# Patient Record
Sex: FEMALE | Race: WHITE
[De-identification: names, ages, dates, MRNs, and addresses within clinical notes are randomized per-mention and may not be internally consistent; named-entity substitution may affect disease eponyms.]

---

## 2022-05-09 ENCOUNTER — HOSPITAL ENCOUNTER (INPATIENT)
Dept: HOSPITAL 54 - ER | Age: 87
LOS: 21 days | Discharge: NURSING, REHAB OR LONG-TERM CARE | DRG: 871 | End: 2022-05-30
Attending: INTERNAL MEDICINE | Admitting: NURSE PRACTITIONER
Payer: MEDICARE

## 2022-05-09 VITALS — WEIGHT: 117 LBS | BODY MASS INDEX: 21.53 KG/M2 | HEIGHT: 62 IN

## 2022-05-09 DIAGNOSIS — I48.91: ICD-10-CM

## 2022-05-09 DIAGNOSIS — E87.4: ICD-10-CM

## 2022-05-09 DIAGNOSIS — E86.0: ICD-10-CM

## 2022-05-09 DIAGNOSIS — I47.1: ICD-10-CM
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
## 2022-05-09 DIAGNOSIS — G92.8: ICD-10-CM

## 2022-05-09 DIAGNOSIS — R13.10: ICD-10-CM

## 2022-05-09 DIAGNOSIS — J96.01: ICD-10-CM

## 2022-05-09 DIAGNOSIS — F03.90: ICD-10-CM

## 2022-05-09 DIAGNOSIS — N39.0: ICD-10-CM

## 2022-05-09 DIAGNOSIS — I50.33: ICD-10-CM

## 2022-05-09 DIAGNOSIS — F09: ICD-10-CM

## 2022-05-09 DIAGNOSIS — E11.9: ICD-10-CM

## 2022-05-09 DIAGNOSIS — B96.20: ICD-10-CM

## 2022-05-09 DIAGNOSIS — E78.5: ICD-10-CM

## 2022-05-09 DIAGNOSIS — Z79.01: ICD-10-CM

## 2022-05-09 DIAGNOSIS — K29.70: ICD-10-CM

## 2022-05-09 DIAGNOSIS — R65.21: ICD-10-CM

## 2022-05-09 DIAGNOSIS — E88.09: ICD-10-CM

## 2022-05-09 DIAGNOSIS — Z79.82: ICD-10-CM

## 2022-05-09 DIAGNOSIS — R79.89: ICD-10-CM

## 2022-05-09 DIAGNOSIS — J69.0: ICD-10-CM

## 2022-05-09 DIAGNOSIS — A41.9: Primary | ICD-10-CM

## 2022-05-09 DIAGNOSIS — J85.1: ICD-10-CM

## 2022-05-09 DIAGNOSIS — I82.612: ICD-10-CM

## 2022-05-09 DIAGNOSIS — Z20.822: ICD-10-CM

## 2022-05-09 DIAGNOSIS — I11.0: ICD-10-CM

## 2022-05-09 LAB
ALBUMIN SERPL BCP-MCNC: 3 G/DL (ref 3.4–5)
ALP SERPL-CCNC: 88 U/L (ref 46–116)
ALT SERPL W P-5'-P-CCNC: 21 U/L (ref 12–78)
AST SERPL W P-5'-P-CCNC: 30 U/L (ref 15–37)
BASE EXCESS BLDA CALC-SCNC: -3 MMOL/L
BASOPHILS # BLD AUTO: 0 K/UL (ref 0–0.2)
BASOPHILS NFR BLD AUTO: 0.1 % (ref 0–2)
BILIRUB DIRECT SERPL-MCNC: 0.5 MG/DL (ref 0–0.2)
BILIRUB SERPL-MCNC: 1.7 MG/DL (ref 0.2–1)
BILIRUB UR QL STRIP: NEGATIVE
BUN SERPL-MCNC: 25 MG/DL (ref 7–18)
CALCIUM SERPL-MCNC: 8.6 MG/DL (ref 8.5–10.1)
CHLORIDE SERPL-SCNC: 105 MMOL/L (ref 98–107)
CO2 SERPL-SCNC: 27 MMOL/L (ref 21–32)
COLOR UR: YELLOW
CREAT SERPL-MCNC: 0.9 MG/DL (ref 0.6–1.3)
DEPRECATED SQUAMOUS URNS QL MICRO: (no result) /HPF
EOSINOPHIL NFR BLD AUTO: 0.1 % (ref 0–6)
GLUCOSE SERPL-MCNC: 200 MG/DL (ref 74–106)
GLUCOSE UR STRIP-MCNC: NEGATIVE MG/DL
HCT VFR BLD AUTO: 41 % (ref 33–45)
HGB BLD-MCNC: 13.3 G/DL (ref 11.5–14.8)
INHALED O2 CONCENTRATION: 80 %
LEUKOCYTE ESTERASE UR QL STRIP: NEGATIVE
LYMPHOCYTES NFR BLD AUTO: 0.3 K/UL (ref 0.8–4.8)
LYMPHOCYTES NFR BLD AUTO: 3.1 % (ref 20–44)
MCHC RBC AUTO-ENTMCNC: 33 G/DL (ref 31–36)
MCV RBC AUTO: 81 FL (ref 82–100)
MONOCYTES NFR BLD AUTO: 0.3 K/UL (ref 0.1–1.3)
MONOCYTES NFR BLD AUTO: 3.7 % (ref 2–12)
NEUTROPHILS # BLD AUTO: 7.8 K/UL (ref 1.8–8.9)
NEUTROPHILS NFR BLD AUTO: 93 % (ref 43–81)
NITRITE UR QL STRIP: POSITIVE
PCO2 TEMP ADJ BLDA: 31.6 MMHG (ref 35–45)
PH TEMP ADJ BLDA: 7.43 [PH] (ref 7.35–7.45)
PH UR STRIP: 6 [PH] (ref 5–8)
PLATELET # BLD AUTO: 213 K/UL (ref 150–450)
PO2 TEMP ADJ BLDA: 163.3 MMHG (ref 75–100)
POTASSIUM SERPL-SCNC: 3.9 MMOL/L (ref 3.5–5.1)
PROT SERPL-MCNC: 6.4 G/DL (ref 6.4–8.2)
PROT UR QL STRIP: (no result) MG/DL
RBC # BLD AUTO: 5 MIL/UL (ref 4–5.2)
RBC #/AREA URNS HPF: (no result) /HPF (ref 0–2)
SODIUM SERPL-SCNC: 138 MMOL/L (ref 136–145)
UROBILINOGEN UR STRIP-MCNC: 2 EU/DL
VENTILATION MODE VENT: (no result)
WBC #/AREA URNS HPF: (no result) /HPF
WBC #/AREA URNS HPF: (no result) /HPF (ref 0–3)
WBC NRBC COR # BLD AUTO: 8.4 K/UL (ref 4.3–11)

## 2022-05-09 PROCEDURE — 5A09357 ASSISTANCE WITH RESPIRATORY VENTILATION, LESS THAN 24 CONSECUTIVE HOURS, CONTINUOUS POSITIVE AIRWAY PRESSURE: ICD-10-PCS | Performed by: INTERNAL MEDICINE

## 2022-05-09 PROCEDURE — A6253 ABSORPT DRG > 48 SQ IN W/O B: HCPCS

## 2022-05-09 PROCEDURE — C9113 INJ PANTOPRAZOLE SODIUM, VIA: HCPCS

## 2022-05-09 PROCEDURE — A6403 STERILE GAUZE>16 <= 48 SQ IN: HCPCS

## 2022-05-09 PROCEDURE — G0378 HOSPITAL OBSERVATION PER HR: HCPCS

## 2022-05-09 PROCEDURE — C9803 HOPD COVID-19 SPEC COLLECT: HCPCS

## 2022-05-09 NOTE — NUR
TO ER BED 5. BIBRA 39 FROM [facility] FOR ALOC. PT IS AAOX0 , WARM TO TOUCH 
, IN RESPIRATORY DISTRESS. MD AT BEDSIDE. RT AT BEDSIDE. CONNECTED TO MONITOR.

## 2022-05-09 NOTE — NUR
RAC#20G S/L; PATETN AND INTACT. BLOOD COLLECTED AND SENT TO LAB.

PTA L HAND #18G S/L NS 500ML IVF INFUSING

## 2022-05-09 NOTE — NUR
ADMINISTERED AMIO 150MG VIA MDS ORDERS DUE TO URGENT V/S

/86. , SPO2 97 ON BIPAP FIO2 80%.

WILL REASSESS V/S CLOSELY.

## 2022-05-10 VITALS — SYSTOLIC BLOOD PRESSURE: 132 MMHG | DIASTOLIC BLOOD PRESSURE: 55 MMHG

## 2022-05-10 VITALS — DIASTOLIC BLOOD PRESSURE: 50 MMHG | SYSTOLIC BLOOD PRESSURE: 117 MMHG

## 2022-05-10 VITALS — DIASTOLIC BLOOD PRESSURE: 68 MMHG | SYSTOLIC BLOOD PRESSURE: 149 MMHG

## 2022-05-10 VITALS — SYSTOLIC BLOOD PRESSURE: 124 MMHG | DIASTOLIC BLOOD PRESSURE: 57 MMHG

## 2022-05-10 VITALS — DIASTOLIC BLOOD PRESSURE: 85 MMHG | SYSTOLIC BLOOD PRESSURE: 145 MMHG

## 2022-05-10 VITALS — DIASTOLIC BLOOD PRESSURE: 69 MMHG | SYSTOLIC BLOOD PRESSURE: 137 MMHG

## 2022-05-10 VITALS — DIASTOLIC BLOOD PRESSURE: 56 MMHG | SYSTOLIC BLOOD PRESSURE: 95 MMHG

## 2022-05-10 VITALS — DIASTOLIC BLOOD PRESSURE: 67 MMHG | SYSTOLIC BLOOD PRESSURE: 117 MMHG

## 2022-05-10 VITALS — DIASTOLIC BLOOD PRESSURE: 83 MMHG | SYSTOLIC BLOOD PRESSURE: 133 MMHG

## 2022-05-10 VITALS — SYSTOLIC BLOOD PRESSURE: 109 MMHG | DIASTOLIC BLOOD PRESSURE: 48 MMHG

## 2022-05-10 VITALS — SYSTOLIC BLOOD PRESSURE: 138 MMHG | DIASTOLIC BLOOD PRESSURE: 76 MMHG

## 2022-05-10 VITALS — SYSTOLIC BLOOD PRESSURE: 147 MMHG | DIASTOLIC BLOOD PRESSURE: 67 MMHG

## 2022-05-10 VITALS — DIASTOLIC BLOOD PRESSURE: 69 MMHG | SYSTOLIC BLOOD PRESSURE: 142 MMHG

## 2022-05-10 VITALS — SYSTOLIC BLOOD PRESSURE: 108 MMHG | DIASTOLIC BLOOD PRESSURE: 54 MMHG

## 2022-05-10 VITALS — DIASTOLIC BLOOD PRESSURE: 65 MMHG | SYSTOLIC BLOOD PRESSURE: 130 MMHG

## 2022-05-10 VITALS — SYSTOLIC BLOOD PRESSURE: 115 MMHG | DIASTOLIC BLOOD PRESSURE: 66 MMHG

## 2022-05-10 VITALS — SYSTOLIC BLOOD PRESSURE: 136 MMHG | DIASTOLIC BLOOD PRESSURE: 72 MMHG

## 2022-05-10 VITALS — DIASTOLIC BLOOD PRESSURE: 50 MMHG | SYSTOLIC BLOOD PRESSURE: 104 MMHG

## 2022-05-10 VITALS — SYSTOLIC BLOOD PRESSURE: 139 MMHG | DIASTOLIC BLOOD PRESSURE: 68 MMHG

## 2022-05-10 VITALS — SYSTOLIC BLOOD PRESSURE: 126 MMHG | DIASTOLIC BLOOD PRESSURE: 73 MMHG

## 2022-05-10 VITALS — DIASTOLIC BLOOD PRESSURE: 62 MMHG | SYSTOLIC BLOOD PRESSURE: 115 MMHG

## 2022-05-10 VITALS — DIASTOLIC BLOOD PRESSURE: 76 MMHG | SYSTOLIC BLOOD PRESSURE: 143 MMHG

## 2022-05-10 VITALS — SYSTOLIC BLOOD PRESSURE: 120 MMHG | DIASTOLIC BLOOD PRESSURE: 68 MMHG

## 2022-05-10 VITALS — DIASTOLIC BLOOD PRESSURE: 58 MMHG | SYSTOLIC BLOOD PRESSURE: 116 MMHG

## 2022-05-10 VITALS — SYSTOLIC BLOOD PRESSURE: 145 MMHG | DIASTOLIC BLOOD PRESSURE: 80 MMHG

## 2022-05-10 VITALS — DIASTOLIC BLOOD PRESSURE: 57 MMHG | SYSTOLIC BLOOD PRESSURE: 95 MMHG

## 2022-05-10 VITALS — SYSTOLIC BLOOD PRESSURE: 143 MMHG | DIASTOLIC BLOOD PRESSURE: 73 MMHG

## 2022-05-10 VITALS — DIASTOLIC BLOOD PRESSURE: 76 MMHG | SYSTOLIC BLOOD PRESSURE: 128 MMHG

## 2022-05-10 VITALS — DIASTOLIC BLOOD PRESSURE: 92 MMHG | SYSTOLIC BLOOD PRESSURE: 174 MMHG

## 2022-05-10 VITALS — DIASTOLIC BLOOD PRESSURE: 73 MMHG | SYSTOLIC BLOOD PRESSURE: 139 MMHG

## 2022-05-10 VITALS — DIASTOLIC BLOOD PRESSURE: 76 MMHG | SYSTOLIC BLOOD PRESSURE: 142 MMHG

## 2022-05-10 VITALS — SYSTOLIC BLOOD PRESSURE: 100 MMHG | DIASTOLIC BLOOD PRESSURE: 53 MMHG

## 2022-05-10 VITALS — DIASTOLIC BLOOD PRESSURE: 71 MMHG | SYSTOLIC BLOOD PRESSURE: 145 MMHG

## 2022-05-10 VITALS — SYSTOLIC BLOOD PRESSURE: 143 MMHG | DIASTOLIC BLOOD PRESSURE: 77 MMHG

## 2022-05-10 VITALS — SYSTOLIC BLOOD PRESSURE: 151 MMHG | DIASTOLIC BLOOD PRESSURE: 73 MMHG

## 2022-05-10 VITALS — DIASTOLIC BLOOD PRESSURE: 54 MMHG | SYSTOLIC BLOOD PRESSURE: 117 MMHG

## 2022-05-10 VITALS — DIASTOLIC BLOOD PRESSURE: 73 MMHG | SYSTOLIC BLOOD PRESSURE: 155 MMHG

## 2022-05-10 VITALS — DIASTOLIC BLOOD PRESSURE: 65 MMHG | SYSTOLIC BLOOD PRESSURE: 116 MMHG

## 2022-05-10 VITALS — DIASTOLIC BLOOD PRESSURE: 73 MMHG | SYSTOLIC BLOOD PRESSURE: 134 MMHG

## 2022-05-10 VITALS — SYSTOLIC BLOOD PRESSURE: 143 MMHG | DIASTOLIC BLOOD PRESSURE: 68 MMHG

## 2022-05-10 VITALS — SYSTOLIC BLOOD PRESSURE: 129 MMHG | DIASTOLIC BLOOD PRESSURE: 66 MMHG

## 2022-05-10 VITALS — DIASTOLIC BLOOD PRESSURE: 80 MMHG | SYSTOLIC BLOOD PRESSURE: 143 MMHG

## 2022-05-10 VITALS — SYSTOLIC BLOOD PRESSURE: 100 MMHG | DIASTOLIC BLOOD PRESSURE: 60 MMHG

## 2022-05-10 VITALS — SYSTOLIC BLOOD PRESSURE: 118 MMHG | DIASTOLIC BLOOD PRESSURE: 66 MMHG

## 2022-05-10 VITALS — DIASTOLIC BLOOD PRESSURE: 74 MMHG | SYSTOLIC BLOOD PRESSURE: 133 MMHG

## 2022-05-10 LAB
BASOPHILS # BLD AUTO: 0 K/UL (ref 0–0.2)
BASOPHILS NFR BLD AUTO: 0.1 % (ref 0–2)
BUN SERPL-MCNC: 27 MG/DL (ref 7–18)
CALCIUM SERPL-MCNC: 8.3 MG/DL (ref 8.5–10.1)
CHLORIDE SERPL-SCNC: 108 MMOL/L (ref 98–107)
CO2 SERPL-SCNC: 23 MMOL/L (ref 21–32)
CREAT SERPL-MCNC: 0.9 MG/DL (ref 0.6–1.3)
EOSINOPHIL NFR BLD AUTO: 0 % (ref 0–6)
GLUCOSE SERPL-MCNC: 162 MG/DL (ref 74–106)
HCT VFR BLD AUTO: 36 % (ref 33–45)
HGB BLD-MCNC: 11.8 G/DL (ref 11.5–14.8)
LYMPHOCYTES NFR BLD AUTO: 0.5 K/UL (ref 0.8–4.8)
LYMPHOCYTES NFR BLD AUTO: 4.8 % (ref 20–44)
LYMPHOCYTES NFR BLD MANUAL: 3 % (ref 16–48)
MAGNESIUM SERPL-MCNC: 2 MG/DL (ref 1.8–2.4)
MCHC RBC AUTO-ENTMCNC: 33 G/DL (ref 31–36)
MCV RBC AUTO: 82 FL (ref 82–100)
MONOCYTES NFR BLD AUTO: 0.6 K/UL (ref 0.1–1.3)
MONOCYTES NFR BLD AUTO: 6 % (ref 2–12)
MONOCYTES NFR BLD MANUAL: 2 % (ref 0–11)
NEUTROPHILS # BLD AUTO: 8.9 K/UL (ref 1.8–8.9)
NEUTROPHILS NFR BLD AUTO: 89.1 % (ref 43–81)
NEUTS BAND NFR BLD MANUAL: 7 % (ref 0–5)
NEUTS SEG NFR BLD MANUAL: 88 % (ref 42–76)
PHOSPHATE SERPL-MCNC: 4.6 MG/DL (ref 2.5–4.9)
PLATELET # BLD AUTO: 166 K/UL (ref 150–450)
POTASSIUM SERPL-SCNC: 4.1 MMOL/L (ref 3.5–5.1)
RBC # BLD AUTO: 4.44 MIL/UL (ref 4–5.2)
SODIUM SERPL-SCNC: 140 MMOL/L (ref 136–145)
TSH SERPL DL<=0.005 MIU/L-ACNC: 1.57 UIU/ML (ref 0.36–3.74)
WBC NRBC COR # BLD AUTO: 10 K/UL (ref 4.3–11)

## 2022-05-10 RX ADMIN — SODIUM CHLORIDE PRN MLS/HR: 9 INJECTION, SOLUTION INTRAVENOUS at 12:28

## 2022-05-10 RX ADMIN — ENOXAPARIN SODIUM SCH MG: 30 INJECTION SUBCUTANEOUS at 01:50

## 2022-05-10 RX ADMIN — SODIUM CHLORIDE SCH MG: 9 INJECTION, SOLUTION INTRAVENOUS at 01:49

## 2022-05-10 RX ADMIN — ENOXAPARIN SODIUM SCH MG: 30 INJECTION SUBCUTANEOUS at 21:14

## 2022-05-10 RX ADMIN — METOPROLOL TARTRATE SCH MG: 25 TABLET, FILM COATED ORAL at 21:00

## 2022-05-10 RX ADMIN — SODIUM CHLORIDE SCH MG: 9 INJECTION, SOLUTION INTRAVENOUS at 09:30

## 2022-05-10 RX ADMIN — METOPROLOL TARTRATE SCH MG: 25 TABLET, FILM COATED ORAL at 18:00

## 2022-05-10 RX ADMIN — CEFEPIME HYDROCHLORIDE SCH MLS/HR: 1 INJECTION, POWDER, FOR SOLUTION INTRAMUSCULAR; INTRAVENOUS at 21:13

## 2022-05-10 RX ADMIN — DEXTROSE MONOHYDRATE PRN MLS/HR: 50 INJECTION, SOLUTION INTRAVENOUS at 01:49

## 2022-05-10 RX ADMIN — SODIUM CHLORIDE PRN MLS/HR: 9 INJECTION, SOLUTION INTRAVENOUS at 01:45

## 2022-05-10 RX ADMIN — ACETAMINOPHEN PRN MG: 650 SUPPOSITORY RECTAL at 15:45

## 2022-05-10 RX ADMIN — DEXTROSE MONOHYDRATE PRN MLS/HR: 50 INJECTION, SOLUTION INTRAVENOUS at 10:38

## 2022-05-10 NOTE — NUR
rn notes 

GET TO ORDER FROM DR COOPER REMOVE BIPAP, AND PUT NC @4L. PATIENT CONFUSED,  UNABLE TO 
EXPRESS SELF,  NPO. TITRATED AMIODARONE TO THE 0.5 MG/ML HR, ALSO INFUSING NS @100ML/HR 
INTACT. KEEP HOB ELEVATED. ASSIST TURN AND REPOSITION 2 HR. WILL FOLLOW UP.

## 2022-05-10 NOTE — NUR
RN NOTES 

RECEIVED PATIENT ON BIPAP, TOLERAING WELL, VSS, NO ACUTE RESPIRATORY DISTRESS. PATIENT ABLE 
TO OPEN EYES. SEEN PATIENT VIA WOUND RN, GET NEW OPDERS, ASSIST PATIENT TURN AND REPOSTION.  
LIUS RINCON YELLOW OUTPUT. CALL LIGHT WITHIN TO REACH. WILL FOLLOW UP.

## 2022-05-10 NOTE — NUR
RN NOTES

ABG DONE VIA RT PH-7.46, PCO2-31.4, PO2-59.5,HCO3-22, NOTIFIED Dr COOPER AND GET NEW ORDER TO 
INCREASE OXYGEN ONE MORE LITER NOW IS 3LNC, ORDER TAKEN AND CARRIED OUT. ALSO DEEP SUCTION 
DONE VIA RT. ASSIST TURN AND REPOSITION 2 HR.
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
## 2022-05-10 NOTE — NUR
CALLED [facility] FROM PT'S MEDICAL HX & STATED RESIDENT IS NOT 
FROM THERE . CALLED GHAZI & SON NOT PICKING UP PHONE.

## 2022-05-10 NOTE — NUR
RN NOTES 

AMIODARONE DRIP WAS D/C VIA Dr ÁLVAREZ, GET NEW ORDER LOPRESSOR PO scheduled, BUT BECAUSE OF 
PATIENT  NPO, CONFUSED,  ASPIRATION PRECAUTION. MD NOTIFIED. PM CARE DONE, SMITH OUTPUT WAS 
POOR 200ML ENTIRE SHIFT. , NEPHRALOGIST AWARE OF.  ENDORSED ONCOMING NURSE MAYE.

## 2022-05-10 NOTE — NUR
rn notes

 patient % on NC titrated to the 2l. patient confused, unable to verbalize self, seen 
hospitalist.  assist turn and reposition q 2 hr. will follow up.

## 2022-05-11 VITALS — DIASTOLIC BLOOD PRESSURE: 74 MMHG | SYSTOLIC BLOOD PRESSURE: 146 MMHG

## 2022-05-11 VITALS — SYSTOLIC BLOOD PRESSURE: 131 MMHG | DIASTOLIC BLOOD PRESSURE: 68 MMHG

## 2022-05-11 VITALS — DIASTOLIC BLOOD PRESSURE: 66 MMHG | SYSTOLIC BLOOD PRESSURE: 133 MMHG

## 2022-05-11 VITALS — SYSTOLIC BLOOD PRESSURE: 147 MMHG | DIASTOLIC BLOOD PRESSURE: 73 MMHG

## 2022-05-11 VITALS — DIASTOLIC BLOOD PRESSURE: 74 MMHG | SYSTOLIC BLOOD PRESSURE: 119 MMHG

## 2022-05-11 VITALS — DIASTOLIC BLOOD PRESSURE: 74 MMHG | SYSTOLIC BLOOD PRESSURE: 144 MMHG

## 2022-05-11 VITALS — SYSTOLIC BLOOD PRESSURE: 146 MMHG | DIASTOLIC BLOOD PRESSURE: 68 MMHG

## 2022-05-11 VITALS — DIASTOLIC BLOOD PRESSURE: 68 MMHG | SYSTOLIC BLOOD PRESSURE: 149 MMHG

## 2022-05-11 VITALS — SYSTOLIC BLOOD PRESSURE: 131 MMHG | DIASTOLIC BLOOD PRESSURE: 59 MMHG

## 2022-05-11 VITALS — DIASTOLIC BLOOD PRESSURE: 56 MMHG | SYSTOLIC BLOOD PRESSURE: 111 MMHG

## 2022-05-11 VITALS — DIASTOLIC BLOOD PRESSURE: 53 MMHG | SYSTOLIC BLOOD PRESSURE: 108 MMHG

## 2022-05-11 VITALS — DIASTOLIC BLOOD PRESSURE: 93 MMHG | SYSTOLIC BLOOD PRESSURE: 126 MMHG

## 2022-05-11 VITALS — DIASTOLIC BLOOD PRESSURE: 56 MMHG | SYSTOLIC BLOOD PRESSURE: 113 MMHG

## 2022-05-11 VITALS — SYSTOLIC BLOOD PRESSURE: 134 MMHG | DIASTOLIC BLOOD PRESSURE: 71 MMHG

## 2022-05-11 VITALS — SYSTOLIC BLOOD PRESSURE: 143 MMHG | DIASTOLIC BLOOD PRESSURE: 76 MMHG

## 2022-05-11 VITALS — DIASTOLIC BLOOD PRESSURE: 68 MMHG | SYSTOLIC BLOOD PRESSURE: 143 MMHG

## 2022-05-11 VITALS — SYSTOLIC BLOOD PRESSURE: 145 MMHG | DIASTOLIC BLOOD PRESSURE: 71 MMHG

## 2022-05-11 VITALS — SYSTOLIC BLOOD PRESSURE: 149 MMHG | DIASTOLIC BLOOD PRESSURE: 77 MMHG

## 2022-05-11 VITALS — SYSTOLIC BLOOD PRESSURE: 131 MMHG | DIASTOLIC BLOOD PRESSURE: 66 MMHG

## 2022-05-11 VITALS — SYSTOLIC BLOOD PRESSURE: 127 MMHG | DIASTOLIC BLOOD PRESSURE: 60 MMHG

## 2022-05-11 VITALS — SYSTOLIC BLOOD PRESSURE: 130 MMHG | DIASTOLIC BLOOD PRESSURE: 83 MMHG

## 2022-05-11 VITALS — DIASTOLIC BLOOD PRESSURE: 60 MMHG | SYSTOLIC BLOOD PRESSURE: 121 MMHG

## 2022-05-11 VITALS — DIASTOLIC BLOOD PRESSURE: 70 MMHG | SYSTOLIC BLOOD PRESSURE: 137 MMHG

## 2022-05-11 VITALS — SYSTOLIC BLOOD PRESSURE: 123 MMHG | DIASTOLIC BLOOD PRESSURE: 63 MMHG

## 2022-05-11 VITALS — DIASTOLIC BLOOD PRESSURE: 84 MMHG | SYSTOLIC BLOOD PRESSURE: 160 MMHG

## 2022-05-11 VITALS — SYSTOLIC BLOOD PRESSURE: 131 MMHG | DIASTOLIC BLOOD PRESSURE: 63 MMHG

## 2022-05-11 VITALS — DIASTOLIC BLOOD PRESSURE: 83 MMHG | SYSTOLIC BLOOD PRESSURE: 130 MMHG

## 2022-05-11 VITALS — SYSTOLIC BLOOD PRESSURE: 141 MMHG | DIASTOLIC BLOOD PRESSURE: 66 MMHG

## 2022-05-11 VITALS — DIASTOLIC BLOOD PRESSURE: 62 MMHG | SYSTOLIC BLOOD PRESSURE: 144 MMHG

## 2022-05-11 VITALS — DIASTOLIC BLOOD PRESSURE: 71 MMHG | SYSTOLIC BLOOD PRESSURE: 123 MMHG

## 2022-05-11 VITALS — DIASTOLIC BLOOD PRESSURE: 55 MMHG | SYSTOLIC BLOOD PRESSURE: 121 MMHG

## 2022-05-11 VITALS — DIASTOLIC BLOOD PRESSURE: 55 MMHG | SYSTOLIC BLOOD PRESSURE: 120 MMHG

## 2022-05-11 VITALS — SYSTOLIC BLOOD PRESSURE: 130 MMHG | DIASTOLIC BLOOD PRESSURE: 62 MMHG

## 2022-05-11 VITALS — DIASTOLIC BLOOD PRESSURE: 48 MMHG | SYSTOLIC BLOOD PRESSURE: 110 MMHG

## 2022-05-11 VITALS — SYSTOLIC BLOOD PRESSURE: 132 MMHG | DIASTOLIC BLOOD PRESSURE: 73 MMHG

## 2022-05-11 VITALS — DIASTOLIC BLOOD PRESSURE: 71 MMHG | SYSTOLIC BLOOD PRESSURE: 137 MMHG

## 2022-05-11 VITALS — SYSTOLIC BLOOD PRESSURE: 146 MMHG | DIASTOLIC BLOOD PRESSURE: 73 MMHG

## 2022-05-11 VITALS — SYSTOLIC BLOOD PRESSURE: 127 MMHG | DIASTOLIC BLOOD PRESSURE: 70 MMHG

## 2022-05-11 VITALS — SYSTOLIC BLOOD PRESSURE: 131 MMHG | DIASTOLIC BLOOD PRESSURE: 52 MMHG

## 2022-05-11 VITALS — SYSTOLIC BLOOD PRESSURE: 107 MMHG | DIASTOLIC BLOOD PRESSURE: 73 MMHG

## 2022-05-11 VITALS — DIASTOLIC BLOOD PRESSURE: 67 MMHG | SYSTOLIC BLOOD PRESSURE: 136 MMHG

## 2022-05-11 VITALS — DIASTOLIC BLOOD PRESSURE: 51 MMHG | SYSTOLIC BLOOD PRESSURE: 120 MMHG

## 2022-05-11 LAB
BASOPHILS # BLD AUTO: 0 K/UL (ref 0–0.2)
BASOPHILS NFR BLD AUTO: 0.1 % (ref 0–2)
BUN SERPL-MCNC: 23 MG/DL (ref 7–18)
CALCIUM SERPL-MCNC: 8.3 MG/DL (ref 8.5–10.1)
CHLORIDE SERPL-SCNC: 109 MMOL/L (ref 98–107)
CO2 SERPL-SCNC: 26 MMOL/L (ref 21–32)
CREAT SERPL-MCNC: 0.8 MG/DL (ref 0.6–1.3)
EOSINOPHIL NFR BLD AUTO: 0.7 % (ref 0–6)
GLUCOSE SERPL-MCNC: 90 MG/DL (ref 74–106)
HCT VFR BLD AUTO: 36 % (ref 33–45)
HGB BLD-MCNC: 11.6 G/DL (ref 11.5–14.8)
LYMPHOCYTES NFR BLD AUTO: 0.7 K/UL (ref 0.8–4.8)
LYMPHOCYTES NFR BLD AUTO: 9.1 % (ref 20–44)
MAGNESIUM SERPL-MCNC: 2.1 MG/DL (ref 1.8–2.4)
MCHC RBC AUTO-ENTMCNC: 33 G/DL (ref 31–36)
MCV RBC AUTO: 82 FL (ref 82–100)
MONOCYTES NFR BLD AUTO: 0.5 K/UL (ref 0.1–1.3)
MONOCYTES NFR BLD AUTO: 6.6 % (ref 2–12)
NEUTROPHILS # BLD AUTO: 6.9 K/UL (ref 1.8–8.9)
NEUTROPHILS NFR BLD AUTO: 83.5 % (ref 43–81)
PHOSPHATE SERPL-MCNC: 3.5 MG/DL (ref 2.5–4.9)
PLATELET # BLD AUTO: 120 K/UL (ref 150–450)
POTASSIUM SERPL-SCNC: 4 MMOL/L (ref 3.5–5.1)
RBC # BLD AUTO: 4.33 MIL/UL (ref 4–5.2)
SODIUM SERPL-SCNC: 142 MMOL/L (ref 136–145)
WBC NRBC COR # BLD AUTO: 8.3 K/UL (ref 4.3–11)

## 2022-05-11 RX ADMIN — DEXTROSE MONOHYDRATE PRN MLS/HR: 50 INJECTION, SOLUTION INTRAVENOUS at 17:32

## 2022-05-11 RX ADMIN — SODIUM CHLORIDE SCH MG: 9 INJECTION, SOLUTION INTRAVENOUS at 09:00

## 2022-05-11 RX ADMIN — SODIUM CHLORIDE PRN MLS/HR: 9 INJECTION, SOLUTION INTRAVENOUS at 00:09

## 2022-05-11 RX ADMIN — ENOXAPARIN SODIUM SCH MG: 60 INJECTION SUBCUTANEOUS at 16:59

## 2022-05-11 RX ADMIN — CEFEPIME HYDROCHLORIDE SCH MLS/HR: 1 INJECTION, POWDER, FOR SOLUTION INTRAMUSCULAR; INTRAVENOUS at 21:17

## 2022-05-11 RX ADMIN — SODIUM CHLORIDE PRN MLS/HR: 9 INJECTION, SOLUTION INTRAVENOUS at 14:31

## 2022-05-11 RX ADMIN — METOPROLOL TARTRATE SCH MG: 25 TABLET, FILM COATED ORAL at 09:00

## 2022-05-11 RX ADMIN — METOPROLOL TARTRATE SCH MG: 25 TABLET, FILM COATED ORAL at 21:00

## 2022-05-11 RX ADMIN — DEXTROSE MONOHYDRATE PRN MLS/HR: 50 INJECTION, SOLUTION INTRAVENOUS at 11:11

## 2022-05-11 RX ADMIN — ACETAMINOPHEN PRN MG: 650 SUPPOSITORY RECTAL at 13:13

## 2022-05-11 NOTE — NUR
RN NOTE



PT NOTED IN TELE MONITOR AFIB RVR WITH -190S. CARDIO MD MADE AWARE, STAT EKG ORDERED 
AND SENT TO CARDIO MD. WITH NEW ORDERS TO START ON AMIODARONE. LOADING DOSE GIVEN WITH BP 
MAINTAINED WNL. PT IS CURRENTLY ON 1MG/MIN DOSE X 6HRS. WILL CONTINUE TO MONITOR. PT HR-157

## 2022-05-11 NOTE — NUR
ICU/LVN

2100 DOSE OF LOPRESSOR HELD DUE TO PT IS LETHARGIC AND THE POSSIBILITY OF ASPIRATION. WILL 
CONTINUE TO MONITOR THIS PT'S BLOOD PRESSURE.

## 2022-05-11 NOTE — NUR
RN NOTE



PT RESTING IN BED, PT AWAKE AND RESPONSIVE TO STIMULI. ON O2 @3L VIA NC, NOT IN RESPI 
DISTRESS. CONTINUES ON IVF NS @100/HR. TOLERATING WELL. PT STILL NOTED SR AND CONTINUES ON 
AMIO DRIP @0.5MG/MIN. MORNING CARE DONE. NEEDS ATTENDED. SAFETY MEASURES NOTED. WILL ENDORSE 
TO NEXT SHIFT.

## 2022-05-11 NOTE — NUR
[id number]:

 faxed  to [facility] FAX:[phone] & [facility] FAX: [phone].

## 2022-05-12 VITALS — DIASTOLIC BLOOD PRESSURE: 77 MMHG | SYSTOLIC BLOOD PRESSURE: 146 MMHG

## 2022-05-12 VITALS — DIASTOLIC BLOOD PRESSURE: 75 MMHG | SYSTOLIC BLOOD PRESSURE: 159 MMHG

## 2022-05-12 VITALS — SYSTOLIC BLOOD PRESSURE: 157 MMHG | DIASTOLIC BLOOD PRESSURE: 74 MMHG

## 2022-05-12 VITALS — DIASTOLIC BLOOD PRESSURE: 80 MMHG | SYSTOLIC BLOOD PRESSURE: 158 MMHG

## 2022-05-12 VITALS — DIASTOLIC BLOOD PRESSURE: 81 MMHG | SYSTOLIC BLOOD PRESSURE: 166 MMHG

## 2022-05-12 VITALS — SYSTOLIC BLOOD PRESSURE: 167 MMHG | DIASTOLIC BLOOD PRESSURE: 82 MMHG

## 2022-05-12 VITALS — SYSTOLIC BLOOD PRESSURE: 135 MMHG | DIASTOLIC BLOOD PRESSURE: 69 MMHG

## 2022-05-12 VITALS — DIASTOLIC BLOOD PRESSURE: 79 MMHG | SYSTOLIC BLOOD PRESSURE: 168 MMHG

## 2022-05-12 VITALS — SYSTOLIC BLOOD PRESSURE: 130 MMHG | DIASTOLIC BLOOD PRESSURE: 68 MMHG

## 2022-05-12 VITALS — DIASTOLIC BLOOD PRESSURE: 71 MMHG | SYSTOLIC BLOOD PRESSURE: 149 MMHG

## 2022-05-12 VITALS — DIASTOLIC BLOOD PRESSURE: 70 MMHG | SYSTOLIC BLOOD PRESSURE: 138 MMHG

## 2022-05-12 VITALS — DIASTOLIC BLOOD PRESSURE: 78 MMHG | SYSTOLIC BLOOD PRESSURE: 161 MMHG

## 2022-05-12 VITALS — SYSTOLIC BLOOD PRESSURE: 142 MMHG | DIASTOLIC BLOOD PRESSURE: 72 MMHG

## 2022-05-12 VITALS — DIASTOLIC BLOOD PRESSURE: 68 MMHG | SYSTOLIC BLOOD PRESSURE: 151 MMHG

## 2022-05-12 VITALS — SYSTOLIC BLOOD PRESSURE: 132 MMHG | DIASTOLIC BLOOD PRESSURE: 71 MMHG

## 2022-05-12 VITALS — DIASTOLIC BLOOD PRESSURE: 100 MMHG | SYSTOLIC BLOOD PRESSURE: 153 MMHG

## 2022-05-12 VITALS — DIASTOLIC BLOOD PRESSURE: 73 MMHG | SYSTOLIC BLOOD PRESSURE: 148 MMHG

## 2022-05-12 VITALS — DIASTOLIC BLOOD PRESSURE: 94 MMHG | SYSTOLIC BLOOD PRESSURE: 165 MMHG

## 2022-05-12 VITALS — SYSTOLIC BLOOD PRESSURE: 158 MMHG | DIASTOLIC BLOOD PRESSURE: 87 MMHG

## 2022-05-12 VITALS — SYSTOLIC BLOOD PRESSURE: 160 MMHG | DIASTOLIC BLOOD PRESSURE: 81 MMHG

## 2022-05-12 VITALS — DIASTOLIC BLOOD PRESSURE: 77 MMHG | SYSTOLIC BLOOD PRESSURE: 173 MMHG

## 2022-05-12 VITALS — DIASTOLIC BLOOD PRESSURE: 63 MMHG | SYSTOLIC BLOOD PRESSURE: 139 MMHG

## 2022-05-12 VITALS — DIASTOLIC BLOOD PRESSURE: 79 MMHG | SYSTOLIC BLOOD PRESSURE: 158 MMHG

## 2022-05-12 VITALS — SYSTOLIC BLOOD PRESSURE: 149 MMHG | DIASTOLIC BLOOD PRESSURE: 75 MMHG

## 2022-05-12 VITALS — DIASTOLIC BLOOD PRESSURE: 77 MMHG | SYSTOLIC BLOOD PRESSURE: 159 MMHG

## 2022-05-12 VITALS — DIASTOLIC BLOOD PRESSURE: 101 MMHG | SYSTOLIC BLOOD PRESSURE: 166 MMHG

## 2022-05-12 VITALS — SYSTOLIC BLOOD PRESSURE: 153 MMHG | DIASTOLIC BLOOD PRESSURE: 73 MMHG

## 2022-05-12 VITALS — DIASTOLIC BLOOD PRESSURE: 74 MMHG | SYSTOLIC BLOOD PRESSURE: 156 MMHG

## 2022-05-12 VITALS — DIASTOLIC BLOOD PRESSURE: 75 MMHG | SYSTOLIC BLOOD PRESSURE: 151 MMHG

## 2022-05-12 VITALS — SYSTOLIC BLOOD PRESSURE: 128 MMHG | DIASTOLIC BLOOD PRESSURE: 66 MMHG

## 2022-05-12 VITALS — DIASTOLIC BLOOD PRESSURE: 99 MMHG | SYSTOLIC BLOOD PRESSURE: 178 MMHG

## 2022-05-12 VITALS — SYSTOLIC BLOOD PRESSURE: 154 MMHG | DIASTOLIC BLOOD PRESSURE: 73 MMHG

## 2022-05-12 VITALS — DIASTOLIC BLOOD PRESSURE: 66 MMHG | SYSTOLIC BLOOD PRESSURE: 148 MMHG

## 2022-05-12 VITALS — SYSTOLIC BLOOD PRESSURE: 149 MMHG | DIASTOLIC BLOOD PRESSURE: 72 MMHG

## 2022-05-12 VITALS — DIASTOLIC BLOOD PRESSURE: 72 MMHG | SYSTOLIC BLOOD PRESSURE: 136 MMHG

## 2022-05-12 VITALS — SYSTOLIC BLOOD PRESSURE: 158 MMHG | DIASTOLIC BLOOD PRESSURE: 78 MMHG

## 2022-05-12 VITALS — SYSTOLIC BLOOD PRESSURE: 132 MMHG | DIASTOLIC BLOOD PRESSURE: 64 MMHG

## 2022-05-12 VITALS — DIASTOLIC BLOOD PRESSURE: 97 MMHG | SYSTOLIC BLOOD PRESSURE: 175 MMHG

## 2022-05-12 VITALS — DIASTOLIC BLOOD PRESSURE: 61 MMHG | SYSTOLIC BLOOD PRESSURE: 144 MMHG

## 2022-05-12 VITALS — SYSTOLIC BLOOD PRESSURE: 150 MMHG | DIASTOLIC BLOOD PRESSURE: 72 MMHG

## 2022-05-12 VITALS — DIASTOLIC BLOOD PRESSURE: 83 MMHG | SYSTOLIC BLOOD PRESSURE: 167 MMHG

## 2022-05-12 VITALS — SYSTOLIC BLOOD PRESSURE: 135 MMHG | DIASTOLIC BLOOD PRESSURE: 67 MMHG

## 2022-05-12 VITALS — DIASTOLIC BLOOD PRESSURE: 84 MMHG | SYSTOLIC BLOOD PRESSURE: 172 MMHG

## 2022-05-12 VITALS — DIASTOLIC BLOOD PRESSURE: 77 MMHG | SYSTOLIC BLOOD PRESSURE: 155 MMHG

## 2022-05-12 VITALS — SYSTOLIC BLOOD PRESSURE: 140 MMHG | DIASTOLIC BLOOD PRESSURE: 69 MMHG

## 2022-05-12 VITALS — SYSTOLIC BLOOD PRESSURE: 176 MMHG | DIASTOLIC BLOOD PRESSURE: 82 MMHG

## 2022-05-12 VITALS — SYSTOLIC BLOOD PRESSURE: 164 MMHG | DIASTOLIC BLOOD PRESSURE: 72 MMHG

## 2022-05-12 VITALS — SYSTOLIC BLOOD PRESSURE: 156 MMHG | DIASTOLIC BLOOD PRESSURE: 72 MMHG

## 2022-05-12 VITALS — DIASTOLIC BLOOD PRESSURE: 67 MMHG | SYSTOLIC BLOOD PRESSURE: 135 MMHG

## 2022-05-12 VITALS — DIASTOLIC BLOOD PRESSURE: 74 MMHG | SYSTOLIC BLOOD PRESSURE: 160 MMHG

## 2022-05-12 VITALS — SYSTOLIC BLOOD PRESSURE: 134 MMHG | DIASTOLIC BLOOD PRESSURE: 67 MMHG

## 2022-05-12 VITALS — SYSTOLIC BLOOD PRESSURE: 185 MMHG | DIASTOLIC BLOOD PRESSURE: 91 MMHG

## 2022-05-12 VITALS — DIASTOLIC BLOOD PRESSURE: 87 MMHG | SYSTOLIC BLOOD PRESSURE: 155 MMHG

## 2022-05-12 VITALS — SYSTOLIC BLOOD PRESSURE: 154 MMHG | DIASTOLIC BLOOD PRESSURE: 83 MMHG

## 2022-05-12 VITALS — SYSTOLIC BLOOD PRESSURE: 146 MMHG | DIASTOLIC BLOOD PRESSURE: 71 MMHG

## 2022-05-12 VITALS — SYSTOLIC BLOOD PRESSURE: 157 MMHG | DIASTOLIC BLOOD PRESSURE: 72 MMHG

## 2022-05-12 VITALS — DIASTOLIC BLOOD PRESSURE: 72 MMHG | SYSTOLIC BLOOD PRESSURE: 157 MMHG

## 2022-05-12 VITALS — SYSTOLIC BLOOD PRESSURE: 168 MMHG | DIASTOLIC BLOOD PRESSURE: 80 MMHG

## 2022-05-12 VITALS — SYSTOLIC BLOOD PRESSURE: 173 MMHG | DIASTOLIC BLOOD PRESSURE: 85 MMHG

## 2022-05-12 VITALS — SYSTOLIC BLOOD PRESSURE: 124 MMHG | DIASTOLIC BLOOD PRESSURE: 56 MMHG

## 2022-05-12 VITALS — DIASTOLIC BLOOD PRESSURE: 63 MMHG | SYSTOLIC BLOOD PRESSURE: 121 MMHG

## 2022-05-12 VITALS — DIASTOLIC BLOOD PRESSURE: 81 MMHG | SYSTOLIC BLOOD PRESSURE: 171 MMHG

## 2022-05-12 VITALS — SYSTOLIC BLOOD PRESSURE: 138 MMHG | DIASTOLIC BLOOD PRESSURE: 66 MMHG

## 2022-05-12 VITALS — DIASTOLIC BLOOD PRESSURE: 79 MMHG | SYSTOLIC BLOOD PRESSURE: 165 MMHG

## 2022-05-12 VITALS — SYSTOLIC BLOOD PRESSURE: 167 MMHG | DIASTOLIC BLOOD PRESSURE: 83 MMHG

## 2022-05-12 VITALS — DIASTOLIC BLOOD PRESSURE: 70 MMHG | SYSTOLIC BLOOD PRESSURE: 158 MMHG

## 2022-05-12 VITALS — SYSTOLIC BLOOD PRESSURE: 172 MMHG | DIASTOLIC BLOOD PRESSURE: 84 MMHG

## 2022-05-12 VITALS — DIASTOLIC BLOOD PRESSURE: 72 MMHG | SYSTOLIC BLOOD PRESSURE: 138 MMHG

## 2022-05-12 VITALS — DIASTOLIC BLOOD PRESSURE: 60 MMHG | SYSTOLIC BLOOD PRESSURE: 126 MMHG

## 2022-05-12 VITALS — DIASTOLIC BLOOD PRESSURE: 79 MMHG | SYSTOLIC BLOOD PRESSURE: 159 MMHG

## 2022-05-12 VITALS — DIASTOLIC BLOOD PRESSURE: 75 MMHG | SYSTOLIC BLOOD PRESSURE: 131 MMHG

## 2022-05-12 LAB
BASOPHILS # BLD AUTO: 0 K/UL (ref 0–0.2)
BASOPHILS NFR BLD AUTO: 0.2 % (ref 0–2)
BUN SERPL-MCNC: 19 MG/DL (ref 7–18)
CALCIUM SERPL-MCNC: 7.8 MG/DL (ref 8.5–10.1)
CHLORIDE SERPL-SCNC: 110 MMOL/L (ref 98–107)
CO2 SERPL-SCNC: 26 MMOL/L (ref 21–32)
CREAT SERPL-MCNC: 0.6 MG/DL (ref 0.6–1.3)
EOSINOPHIL NFR BLD AUTO: 1.1 % (ref 0–6)
GLUCOSE SERPL-MCNC: 106 MG/DL (ref 74–106)
HCT VFR BLD AUTO: 31 % (ref 33–45)
HGB BLD-MCNC: 10.1 G/DL (ref 11.5–14.8)
LYMPHOCYTES NFR BLD AUTO: 0.5 K/UL (ref 0.8–4.8)
LYMPHOCYTES NFR BLD AUTO: 8.4 % (ref 20–44)
MAGNESIUM SERPL-MCNC: 1.9 MG/DL (ref 1.8–2.4)
MCHC RBC AUTO-ENTMCNC: 33 G/DL (ref 31–36)
MCV RBC AUTO: 81 FL (ref 82–100)
MONOCYTES NFR BLD AUTO: 0.4 K/UL (ref 0.1–1.3)
MONOCYTES NFR BLD AUTO: 6.8 % (ref 2–12)
NEUTROPHILS # BLD AUTO: 5.2 K/UL (ref 1.8–8.9)
NEUTROPHILS NFR BLD AUTO: 83.5 % (ref 43–81)
PHOSPHATE SERPL-MCNC: 3 MG/DL (ref 2.5–4.9)
PLATELET # BLD AUTO: 128 K/UL (ref 150–450)
POTASSIUM SERPL-SCNC: 3.1 MMOL/L (ref 3.5–5.1)
RBC # BLD AUTO: 3.77 MIL/UL (ref 4–5.2)
SODIUM SERPL-SCNC: 141 MMOL/L (ref 136–145)
WBC NRBC COR # BLD AUTO: 6.3 K/UL (ref 4.3–11)

## 2022-05-12 PROCEDURE — 05HC33Z INSERTION OF INFUSION DEVICE INTO LEFT BASILIC VEIN, PERCUTANEOUS APPROACH: ICD-10-PCS | Performed by: NURSE PRACTITIONER

## 2022-05-12 RX ADMIN — POTASSIUM CHLORIDE SCH MLS/HR: 200 INJECTION, SOLUTION INTRAVENOUS at 12:29

## 2022-05-12 RX ADMIN — SODIUM CHLORIDE PRN MLS/HR: 9 INJECTION, SOLUTION INTRAVENOUS at 10:02

## 2022-05-12 RX ADMIN — SODIUM CHLORIDE SCH MG: 9 INJECTION, SOLUTION INTRAVENOUS at 09:20

## 2022-05-12 RX ADMIN — AMIODARONE HYDROCHLORIDE SCH MG: 200 TABLET ORAL at 14:57

## 2022-05-12 RX ADMIN — CEFEPIME HYDROCHLORIDE SCH MLS/HR: 1 INJECTION, POWDER, FOR SOLUTION INTRAMUSCULAR; INTRAVENOUS at 20:22

## 2022-05-12 RX ADMIN — ENOXAPARIN SODIUM SCH MG: 60 INJECTION SUBCUTANEOUS at 09:20

## 2022-05-12 RX ADMIN — AMIODARONE HYDROCHLORIDE SCH MG: 200 TABLET ORAL at 20:23

## 2022-05-12 RX ADMIN — METOPROLOL TARTRATE SCH MG: 25 TABLET, FILM COATED ORAL at 08:58

## 2022-05-12 RX ADMIN — ENOXAPARIN SODIUM SCH MG: 60 INJECTION SUBCUTANEOUS at 20:26

## 2022-05-12 RX ADMIN — POTASSIUM CHLORIDE SCH MLS/HR: 200 INJECTION, SOLUTION INTRAVENOUS at 11:34

## 2022-05-12 RX ADMIN — POTASSIUM CHLORIDE SCH MLS/HR: 200 INJECTION, SOLUTION INTRAVENOUS at 10:36

## 2022-05-12 RX ADMIN — POTASSIUM CHLORIDE SCH MLS/HR: 200 INJECTION, SOLUTION INTRAVENOUS at 09:56

## 2022-05-12 RX ADMIN — METOPROLOL TARTRATE SCH MG: 25 TABLET, FILM COATED ORAL at 20:23

## 2022-05-12 RX ADMIN — SODIUM CHLORIDE PRN MLS/HR: 9 INJECTION, SOLUTION INTRAVENOUS at 01:19

## 2022-05-12 RX ADMIN — SODIUM CHLORIDE PRN MLS/HR: 9 INJECTION, SOLUTION INTRAVENOUS at 21:53

## 2022-05-12 RX ADMIN — HYDRALAZINE HYDROCHLORIDE PRN MG: 20 INJECTION INTRAMUSCULAR; INTRAVENOUS at 09:48

## 2022-05-12 NOTE — NUR
RN ICU

Bedside report taken from [facility] nurse Mcintyre.  Pt asleep, arousable but drowsy and nonverbal. pt 
opens eyes, tracks, nonverbal and does not follow commands.  PERRLA.  pt moves bue 2/5 and 
ble 2/5 to painful stimuli.  pt on 3 L n/c tolerating well.  chinyere lung sounds clear and 
diminished at bases.  pt npo d/t mental status, high risk for aspiration.  bowel sounds 
hypoactive but present.  pt has pike intact and draining clear soila urine.  skin check 
done redness noted on sacrum.  pt NSR on monitor on amio drip. all lines traced.  all drips 
verified.  safety measures in place.  no signs of acute distress at this time.  safety 
measures in place.

## 2022-05-12 NOTE — NUR
RN NOTE



RECEIVED PATIENT IN BED RESTING CLOSED EYES,LETHARGIC,NON VERBAL,ON 3L OXYGEN VIA NASAL 
CANNULA,O2:97% IV SITE IS ON LEFT UPPER ARM MIDLINE INTACT PATENT ON IV HYDRATION NS 
100CC/HR,NGT CHECKED PLACEMENT IN PLACE, NO RESIDUAL NOTED.PATIENT IS NPO.SMITH CATHETER IN 
PLACE URINE DRAINING  YELLOW/CLEAR BY GRAVITY,SAFETY MEASURE IMPLEMENT BED IN LOW POSITION 
AND LOCKED,BED ALARM IS ON CONTINUE TO MONITOR.

## 2022-05-12 NOTE — NUR
[id number]: 

NATASHA emailed  to community care licensing at [email or web address] as fax did not go 
through.

## 2022-05-12 NOTE — NUR
SAFE TWO PERSON TRANSFER FROM ICU TO TELE MED SURG TATE , ROOM 110, NO SIGNIFICANT CHANGES 
NOTED, F/C INTACT PATENT FLOWING CLEAR YELLOW URINE VIA GRAVITY, MIDLINE INTACT PATENT 
FLUSHING NO INFILTRATION, NO IRRITATION NOTED, NG- TUBE IN PLACE PATENT INTACT, NC INTACT 3 
LMP NOTED, OXYGEN SAT BETWEEN 96-98%, REPOSITIONED FOR COMFORT AND BI-LATERAL ELBOWS LIFTED 
ON PILLOWS , BI-LATERAL ANKLES LIFTED ON PILLOWS TO RELIEVE PRESSURE,HOB ELEVATED SEMI - 
FOWLERS POSITION WEIGHT IN BED SCALE 135.69 LBS, BED LOW TO FLOOR, WHEELS LOCKED, PT 
LETHARGIC CALL LIGHT IN REACH.
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
## 2022-05-12 NOTE — NUR
RN ICU



Dr Levin at bedside assessing pt and updated on pt status.  md aware that amiodarone drip 
off at 10 am.  pt NSR at this time.  per md ngt needs to be placed.  verbal order for ngt 
entered per md.  no other orders at this time. son at bedside updated on pt condition and 
place on care.

## 2022-05-12 NOTE — NUR
RN ICU

Dr Tovar messaged and informed that pt -170s and prn iv bp meds requested.  
awaiting md response.  charge nurse Divya ORTA aware.

## 2022-05-12 NOTE — NUR
RN ICU

Dr Arellano at bedside assessing pt and updated on pt status. no new orders at this time.  

PRN telephone order for hydralazine entered per Dr Tovar orders.

## 2022-05-12 NOTE — NUR
RN ICU

Bedside report given to Adrienne ORTA, who is taking  pt as a downgrade out of ICU to tele room 
110.  pt lethargic, opens eyes and moves bue does not follow commands. pt on 3L n/c 
tolerating well.  all lines traced  all drips verified.  pt clean and dry. safety measures 
in place.  no signs of acute distress at this time.

## 2022-05-12 NOTE — NUR
RN ICU

PICC nurse at bedside placing midline.  pt tolerating well.  vitals stable.  will contine to 
monitor.

## 2022-05-12 NOTE — NUR
RN ICU

NGT placed per md order in left nare 65 cm.  positive placement verified kari Stokes RN.  pt 
tolerated well.  vitals stable.  will continue to monitor.

## 2022-05-13 VITALS — SYSTOLIC BLOOD PRESSURE: 151 MMHG | DIASTOLIC BLOOD PRESSURE: 75 MMHG

## 2022-05-13 VITALS — SYSTOLIC BLOOD PRESSURE: 166 MMHG | DIASTOLIC BLOOD PRESSURE: 83 MMHG

## 2022-05-13 VITALS — SYSTOLIC BLOOD PRESSURE: 90 MMHG | DIASTOLIC BLOOD PRESSURE: 45 MMHG

## 2022-05-13 VITALS — SYSTOLIC BLOOD PRESSURE: 129 MMHG | DIASTOLIC BLOOD PRESSURE: 65 MMHG

## 2022-05-13 VITALS — DIASTOLIC BLOOD PRESSURE: 70 MMHG | SYSTOLIC BLOOD PRESSURE: 155 MMHG

## 2022-05-13 VITALS — DIASTOLIC BLOOD PRESSURE: 46 MMHG | SYSTOLIC BLOOD PRESSURE: 160 MMHG

## 2022-05-13 LAB
BASOPHILS # BLD AUTO: 0 K/UL (ref 0–0.2)
BASOPHILS NFR BLD AUTO: 0.2 % (ref 0–2)
BUN SERPL-MCNC: 12 MG/DL (ref 7–18)
CALCIUM SERPL-MCNC: 7.8 MG/DL (ref 8.5–10.1)
CHLORIDE SERPL-SCNC: 107 MMOL/L (ref 98–107)
CO2 SERPL-SCNC: 23 MMOL/L (ref 21–32)
CREAT SERPL-MCNC: 0.6 MG/DL (ref 0.6–1.3)
EOSINOPHIL NFR BLD AUTO: 1 % (ref 0–6)
GLUCOSE SERPL-MCNC: 123 MG/DL (ref 74–106)
HCT VFR BLD AUTO: 32 % (ref 33–45)
HGB BLD-MCNC: 10.6 G/DL (ref 11.5–14.8)
LYMPHOCYTES NFR BLD AUTO: 0.3 K/UL (ref 0.8–4.8)
LYMPHOCYTES NFR BLD AUTO: 5.3 % (ref 20–44)
MAGNESIUM SERPL-MCNC: 1.8 MG/DL (ref 1.8–2.4)
MCHC RBC AUTO-ENTMCNC: 33 G/DL (ref 31–36)
MCV RBC AUTO: 81 FL (ref 82–100)
MONOCYTES NFR BLD AUTO: 0.4 K/UL (ref 0.1–1.3)
MONOCYTES NFR BLD AUTO: 7.2 % (ref 2–12)
NEUTROPHILS # BLD AUTO: 4.5 K/UL (ref 1.8–8.9)
NEUTROPHILS NFR BLD AUTO: 86.3 % (ref 43–81)
PHOSPHATE SERPL-MCNC: 3.8 MG/DL (ref 2.5–4.9)
PLATELET # BLD AUTO: 149 K/UL (ref 150–450)
POTASSIUM SERPL-SCNC: 3 MMOL/L (ref 3.5–5.1)
RBC # BLD AUTO: 4 MIL/UL (ref 4–5.2)
SODIUM SERPL-SCNC: 140 MMOL/L (ref 136–145)
WBC NRBC COR # BLD AUTO: 5.2 K/UL (ref 4.3–11)

## 2022-05-13 RX ADMIN — CEFEPIME HYDROCHLORIDE SCH MLS/HR: 1 INJECTION, POWDER, FOR SOLUTION INTRAMUSCULAR; INTRAVENOUS at 20:32

## 2022-05-13 RX ADMIN — SODIUM CHLORIDE PRN MLS/HR: 9 INJECTION, SOLUTION INTRAVENOUS at 20:33

## 2022-05-13 RX ADMIN — POTASSIUM CHLORIDE SCH MLS/HR: 200 INJECTION, SOLUTION INTRAVENOUS at 13:52

## 2022-05-13 RX ADMIN — AMIODARONE HYDROCHLORIDE SCH MG: 200 TABLET ORAL at 11:06

## 2022-05-13 RX ADMIN — HYDRALAZINE HYDROCHLORIDE PRN MG: 20 INJECTION INTRAMUSCULAR; INTRAVENOUS at 00:41

## 2022-05-13 RX ADMIN — AMIODARONE HYDROCHLORIDE SCH MG: 200 TABLET ORAL at 20:31

## 2022-05-13 RX ADMIN — POTASSIUM CHLORIDE SCH MLS/HR: 200 INJECTION, SOLUTION INTRAVENOUS at 14:54

## 2022-05-13 RX ADMIN — SODIUM CHLORIDE SCH MG: 9 INJECTION, SOLUTION INTRAVENOUS at 08:45

## 2022-05-13 RX ADMIN — POTASSIUM CHLORIDE SCH MLS/HR: 200 INJECTION, SOLUTION INTRAVENOUS at 10:00

## 2022-05-13 RX ADMIN — METOPROLOL TARTRATE SCH MG: 25 TABLET, FILM COATED ORAL at 20:32

## 2022-05-13 RX ADMIN — POTASSIUM CHLORIDE SCH MLS/HR: 200 INJECTION, SOLUTION INTRAVENOUS at 11:46

## 2022-05-13 RX ADMIN — POTASSIUM CHLORIDE SCH MLS/HR: 200 INJECTION, SOLUTION INTRAVENOUS at 12:50

## 2022-05-13 RX ADMIN — POTASSIUM CHLORIDE SCH MLS/HR: 200 INJECTION, SOLUTION INTRAVENOUS at 10:56

## 2022-05-13 RX ADMIN — SODIUM CHLORIDE PRN MLS/HR: 9 INJECTION, SOLUTION INTRAVENOUS at 12:34

## 2022-05-13 RX ADMIN — APIXABAN SCH MG: 2.5 TABLET, FILM COATED ORAL at 17:05

## 2022-05-13 RX ADMIN — ENOXAPARIN SODIUM SCH MG: 60 INJECTION SUBCUTANEOUS at 08:49

## 2022-05-13 RX ADMIN — ACETAMINOPHEN PRN MG: 650 SUPPOSITORY RECTAL at 02:38

## 2022-05-13 RX ADMIN — METOPROLOL TARTRATE SCH MG: 25 TABLET, FILM COATED ORAL at 08:47

## 2022-05-13 NOTE — NUR
RN NOTE



BP IS 90/45 CALLED DR FARRAH ETIENNE SHE SAID CALL CARDIOLOGIST DR ROBERT ÁLVAREZ,NOTED AND 
CARRIED OUT.CALLED DR ÁLVAREZ LEFT MESSAGE.

## 2022-05-13 NOTE — NUR
RN NOTE

PATIENT LETHARGIC,EYE CLOSED,NON VERBAL ON 2L OXYGEN VIA NASAL CANNULA O2:98% SMITH CATHETER 
IN PLACE URINE DRAINING  BY GRAVITY,ALL DUE MEDS GIVEN AS MD ORDERED KEPT CLEAN AND DRY ALL 
THE TIME ,ALL NEEDS MET,TURNED AND REPOSITIONED EVERY 2 HOURS, NO SIGNIFCANT CHANGES NOTED 
ON THIS SHIFT, WILL  ENDORSE TO NIGHT SHIFT NURSE FOR CONTINTIY OF CARE.

## 2022-05-13 NOTE — NUR
RN NOTE



RECEIVED PATIENT IN BED, EYES CLOSED. NON VERBAL. OPENS EYES TO NAME AND TOUCH. BREATHING 
EVEN AND UNLABORED. NO SOB NOTED. ON 2L/MIN VIA NASAL CANNULA. BEDSIDE MONITOR SHOWS O2 
SATURATION OF 97 PERCENT. HOB ELEVATED 35 DEGREES. ON TELE MONITORING. SR AT 83 BPM. NO 
DISTRESS NOTED. NOTED WITH NGT ON RIGHT NARE. NO RESIDUAL WHEN ASPIRATED. CLAMPED. SKIN IS 
WARM AND DRY TO TOUCH. NO BLEEDING NOTED. INDWELLING SMITH CATHETER IN PLACE. DRAINING 
YELLOW URINE BY GRAVITY. LEFT UPPER ARM MIDLINE IN PLACE. BED LOW, IN LOCKED POSITION, CALL 
LIGHT WITHIN REACH, WILL CONTINUE TO MONITOR. 

-------------------------------------------------------------------------------

Addendum: 05/13/22 at 2338 by STIVEN CAMPOS RN

-------------------------------------------------------------------------------

NGT ON LEFT NARE. POSITIVE POSITION VIA INTRODUCTION OF AIR AND AUSCULTATION.  NGT CLAMPED.

## 2022-05-13 NOTE — NUR
RN NOTE



PATIENT REMAINS ON LETHARGIC,EYE CLOSED,NON VERBAL ON 2L OXYGEN VIA NASAL CANNULA O2:98% IV 
SITE IS ON LEFT UPPER ARM MIDLINE INTACT PATENT ON AMIODARONE DRIP 1MG/KG/MIN,AND ON IV NS 
100CC/HR.SMITH CATHETER IN PLACE URINE DRAINING  BY GRAVITY,ALL DUE MEDS GIVEN AS MD ORDERED 
KEPT CLEAN AND DRY ALL THE TIME ,ALL NEEDS MET,TURNED AND REPOSITIONED EVERY 2 HOURS ENDORSE 
NEXT COMING SHIFT FOR CONTINUATION OF CARE.

## 2022-05-13 NOTE — NUR
RN NOTE

RECEIVED PATIENT ON BED RESTING CLOSED EYES,LETHARGIC,NON VERBAL, RESPONDS TO PAINFUL 
STIMULI ON 3L OXYGEN VIA NASAL CANNULA,O2 WNL,  IV SITE IS ON LEFT UPPER ARM MIDLINE INTACT 
PATENT ON IV HYDRATION NS 100CC/HR, AMIO DRIP RUNNING AT 1MG/ HR , ON TELE PT IS SR AT THIS 
TIME, HR IN 70'S , NGT CHECKED PLACEMENT IN PLACE, NO RESIDUAL NOTED.PATIENT IS NPO.SMITH 
CATHETER IN PLACE URINE DRAINING  YELLOW/CLEAR BY GRAVITY,SAFETY MEASURE IMPLEMENT BED IN 
LOWEST  POSITION AND LOCKED,BED ALARM IS ON CONTINUE TO MONITOR.

## 2022-05-13 NOTE — NUR
RN NOTE



EKG RESULT SEND TO DR JAIMIE KEMP NP SHE ORDERED START AMIODARONE DRIP NOTED AND CARRIED 
OUT

## 2022-05-14 VITALS — SYSTOLIC BLOOD PRESSURE: 151 MMHG | DIASTOLIC BLOOD PRESSURE: 67 MMHG

## 2022-05-14 VITALS — SYSTOLIC BLOOD PRESSURE: 171 MMHG | DIASTOLIC BLOOD PRESSURE: 74 MMHG

## 2022-05-14 VITALS — DIASTOLIC BLOOD PRESSURE: 70 MMHG | SYSTOLIC BLOOD PRESSURE: 160 MMHG

## 2022-05-14 VITALS — SYSTOLIC BLOOD PRESSURE: 132 MMHG | DIASTOLIC BLOOD PRESSURE: 65 MMHG

## 2022-05-14 VITALS — DIASTOLIC BLOOD PRESSURE: 85 MMHG | SYSTOLIC BLOOD PRESSURE: 187 MMHG

## 2022-05-14 VITALS — SYSTOLIC BLOOD PRESSURE: 150 MMHG | DIASTOLIC BLOOD PRESSURE: 73 MMHG

## 2022-05-14 LAB
BASOPHILS # BLD AUTO: 0 K/UL (ref 0–0.2)
BASOPHILS NFR BLD AUTO: 0.2 % (ref 0–2)
BUN SERPL-MCNC: 10 MG/DL (ref 7–18)
CALCIUM SERPL-MCNC: 8.1 MG/DL (ref 8.5–10.1)
CHLORIDE SERPL-SCNC: 104 MMOL/L (ref 98–107)
CO2 SERPL-SCNC: 25 MMOL/L (ref 21–32)
CREAT SERPL-MCNC: 0.6 MG/DL (ref 0.6–1.3)
EOSINOPHIL NFR BLD AUTO: 1.7 % (ref 0–6)
GLUCOSE SERPL-MCNC: 84 MG/DL (ref 74–106)
HCT VFR BLD AUTO: 35 % (ref 33–45)
HGB BLD-MCNC: 11.6 G/DL (ref 11.5–14.8)
LYMPHOCYTES NFR BLD AUTO: 0.4 K/UL (ref 0.8–4.8)
LYMPHOCYTES NFR BLD AUTO: 7.7 % (ref 20–44)
MAGNESIUM SERPL-MCNC: 1.7 MG/DL (ref 1.8–2.4)
MCHC RBC AUTO-ENTMCNC: 33 G/DL (ref 31–36)
MCV RBC AUTO: 80 FL (ref 82–100)
MONOCYTES NFR BLD AUTO: 0.5 K/UL (ref 0.1–1.3)
MONOCYTES NFR BLD AUTO: 9.3 % (ref 2–12)
NEUTROPHILS # BLD AUTO: 4 K/UL (ref 1.8–8.9)
NEUTROPHILS NFR BLD AUTO: 81.1 % (ref 43–81)
PHOSPHATE SERPL-MCNC: 3.2 MG/DL (ref 2.5–4.9)
PLATELET # BLD AUTO: 165 K/UL (ref 150–450)
POTASSIUM SERPL-SCNC: 3.4 MMOL/L (ref 3.5–5.1)
RBC # BLD AUTO: 4.38 MIL/UL (ref 4–5.2)
SODIUM SERPL-SCNC: 139 MMOL/L (ref 136–145)
WBC NRBC COR # BLD AUTO: 5 K/UL (ref 4.3–11)

## 2022-05-14 RX ADMIN — PANTOPRAZOLE SODIUM SCH MG: 40 GRANULE, DELAYED RELEASE ORAL at 09:00

## 2022-05-14 RX ADMIN — SODIUM CHLORIDE PRN MLS/HR: 9 INJECTION, SOLUTION INTRAVENOUS at 13:06

## 2022-05-14 RX ADMIN — MAGNESIUM SULFATE IN DEXTROSE SCH MLS/HR: 10 INJECTION, SOLUTION INTRAVENOUS at 09:55

## 2022-05-14 RX ADMIN — AMIODARONE HYDROCHLORIDE SCH MG: 200 TABLET ORAL at 22:19

## 2022-05-14 RX ADMIN — METOPROLOL TARTRATE SCH MG: 25 TABLET, FILM COATED ORAL at 22:18

## 2022-05-14 RX ADMIN — HYDRALAZINE HYDROCHLORIDE PRN MG: 20 INJECTION INTRAMUSCULAR; INTRAVENOUS at 04:22

## 2022-05-14 RX ADMIN — AMIODARONE HYDROCHLORIDE SCH MG: 200 TABLET ORAL at 08:32

## 2022-05-14 RX ADMIN — APIXABAN SCH MG: 2.5 TABLET, FILM COATED ORAL at 16:42

## 2022-05-14 RX ADMIN — MAGNESIUM SULFATE IN DEXTROSE SCH MLS/HR: 10 INJECTION, SOLUTION INTRAVENOUS at 08:31

## 2022-05-14 RX ADMIN — APIXABAN SCH MG: 2.5 TABLET, FILM COATED ORAL at 08:32

## 2022-05-14 RX ADMIN — CEFEPIME HYDROCHLORIDE SCH MLS/HR: 1 INJECTION, POWDER, FOR SOLUTION INTRAMUSCULAR; INTRAVENOUS at 22:18

## 2022-05-14 RX ADMIN — SODIUM CHLORIDE SCH MG: 9 INJECTION, SOLUTION INTRAVENOUS at 08:33

## 2022-05-14 RX ADMIN — METOPROLOL TARTRATE SCH MG: 25 TABLET, FILM COATED ORAL at 08:32

## 2022-05-14 NOTE — NUR
RN NOTE



PT RESTING IN BED, RESPONSIVE TO STIMULI. ON O2 @2L VIA NC. NOT IN RESPI DISTRESS. REMAINS 
NPO EXCEPT MEDS. NGT ON L NARE IN PLACE AND PATENT. EVONNE MIDLINE IN PLACE AND PATENT, WITH 
IVF NS @100ML/HR. TOLERATING WELL. SMITH CATH IN PLACE DRAINING CLEAR YELLOW URINE. PT IS ON 
TELE MONITOR WITH SR READING THIS SHIFT. DUE MEDS GIVEN. NEEDS ATTENDED. SAFETY MEASURES 
FOLLOWED.

## 2022-05-15 VITALS — DIASTOLIC BLOOD PRESSURE: 74 MMHG | SYSTOLIC BLOOD PRESSURE: 174 MMHG

## 2022-05-15 VITALS — SYSTOLIC BLOOD PRESSURE: 117 MMHG | DIASTOLIC BLOOD PRESSURE: 69 MMHG

## 2022-05-15 VITALS — DIASTOLIC BLOOD PRESSURE: 79 MMHG | SYSTOLIC BLOOD PRESSURE: 169 MMHG

## 2022-05-15 VITALS — SYSTOLIC BLOOD PRESSURE: 132 MMHG | DIASTOLIC BLOOD PRESSURE: 86 MMHG

## 2022-05-15 VITALS — DIASTOLIC BLOOD PRESSURE: 74 MMHG | SYSTOLIC BLOOD PRESSURE: 158 MMHG

## 2022-05-15 VITALS — SYSTOLIC BLOOD PRESSURE: 141 MMHG | DIASTOLIC BLOOD PRESSURE: 92 MMHG

## 2022-05-15 LAB
BASOPHILS # BLD AUTO: 0 K/UL (ref 0–0.2)
BASOPHILS NFR BLD AUTO: 0.1 % (ref 0–2)
BUN SERPL-MCNC: 9 MG/DL (ref 7–18)
CALCIUM SERPL-MCNC: 7.7 MG/DL (ref 8.5–10.1)
CHLORIDE SERPL-SCNC: 105 MMOL/L (ref 98–107)
CO2 SERPL-SCNC: 28 MMOL/L (ref 21–32)
CREAT SERPL-MCNC: 0.5 MG/DL (ref 0.6–1.3)
EOSINOPHIL NFR BLD AUTO: 1.8 % (ref 0–6)
GLUCOSE SERPL-MCNC: 88 MG/DL (ref 74–106)
HCT VFR BLD AUTO: 36 % (ref 33–45)
HGB BLD-MCNC: 11.7 G/DL (ref 11.5–14.8)
LYMPHOCYTES NFR BLD AUTO: 0.2 K/UL (ref 0.8–4.8)
LYMPHOCYTES NFR BLD AUTO: 3.9 % (ref 20–44)
MAGNESIUM SERPL-MCNC: 1.9 MG/DL (ref 1.8–2.4)
MCHC RBC AUTO-ENTMCNC: 33 G/DL (ref 31–36)
MCV RBC AUTO: 80 FL (ref 82–100)
MONOCYTES NFR BLD AUTO: 0.4 K/UL (ref 0.1–1.3)
MONOCYTES NFR BLD AUTO: 6.9 % (ref 2–12)
NEUTROPHILS # BLD AUTO: 4.9 K/UL (ref 1.8–8.9)
NEUTROPHILS NFR BLD AUTO: 87.3 % (ref 43–81)
PHOSPHATE SERPL-MCNC: 3.8 MG/DL (ref 2.5–4.9)
PLATELET # BLD AUTO: 179 K/UL (ref 150–450)
POTASSIUM SERPL-SCNC: 3.5 MMOL/L (ref 3.5–5.1)
RBC # BLD AUTO: 4.49 MIL/UL (ref 4–5.2)
SODIUM SERPL-SCNC: 139 MMOL/L (ref 136–145)
WBC NRBC COR # BLD AUTO: 5.6 K/UL (ref 4.3–11)

## 2022-05-15 RX ADMIN — AMIODARONE HYDROCHLORIDE SCH MG: 200 TABLET ORAL at 08:06

## 2022-05-15 RX ADMIN — APIXABAN SCH MG: 2.5 TABLET, FILM COATED ORAL at 16:17

## 2022-05-15 RX ADMIN — PANTOPRAZOLE SODIUM SCH MG: 40 GRANULE, DELAYED RELEASE ORAL at 08:06

## 2022-05-15 RX ADMIN — METOPROLOL TARTRATE SCH MG: 50 TABLET, FILM COATED ORAL at 13:34

## 2022-05-15 RX ADMIN — AMIODARONE HYDROCHLORIDE SCH MG: 200 TABLET ORAL at 21:03

## 2022-05-15 RX ADMIN — APIXABAN SCH MG: 2.5 TABLET, FILM COATED ORAL at 08:06

## 2022-05-15 RX ADMIN — METOPROLOL TARTRATE SCH MG: 25 TABLET, FILM COATED ORAL at 08:05

## 2022-05-15 RX ADMIN — SODIUM CHLORIDE PRN MLS/HR: 9 INJECTION, SOLUTION INTRAVENOUS at 16:27

## 2022-05-15 RX ADMIN — CEFEPIME HYDROCHLORIDE SCH MLS/HR: 1 INJECTION, POWDER, FOR SOLUTION INTRAMUSCULAR; INTRAVENOUS at 20:45

## 2022-05-15 RX ADMIN — METOPROLOL TARTRATE SCH MG: 50 TABLET, FILM COATED ORAL at 16:15

## 2022-05-15 NOTE — NUR
RN NOTE



PT RESTING ASLEEP IN BED, RESPONSIVE TO STIMULI. ON O2 @2L VIA NC. NOT IN RESPI DISTRESS. 
REMAINS NPO EXCEPT MEDS. NGT ON L NARE IN PLACE AND PATENT. EVONNE MIDLINE IN PLACE AND 
PATENT,MIKE G22 IN PLACE WITH IVF NS @100ML/HR. TOLERATING WELL. SMITH CATH IN PLACE DRAINING 
CLEAR YELLOW URINE. PT IS ON TELE MONITOR WITH SR READING AT THIS TIME. SAFETY MEASURES 
FOLLOWED. WILL CONTINUE TO MONITOR. AM AND PM CARE DONE. NEEDS ATTENDED.

## 2022-05-15 NOTE — NUR
Tele RN Opening Note

Pt received in bed, awake with eyes open, nonverbal. On 2L NC with 98% O2sat; no s/s of resp 
distress, no SOB, non-labored breathing. Attached to external monitor, SR, HR 65. Pt noted 
to have EVONNE midline and MIKE 22G, both intact and patent, no s/s of infiltration. NS running 
at 100 ml/hr. NGT noted to be at 75 cm. Bed in lowest position, call light within reach, 
side rails up x3. Will monitor throughout the night.

## 2022-05-15 NOTE — NUR
RN NOTE



PT RECEIVED ASLEEP IN BED, RESPONSIVE TO STIMULI. ON O2 @2L VIA NC. NOT IN RESPI DISTRESS. 
REMAINS NPO EXCEPT MEDS. NGT ON L NARE IN PLACE AND PATENT. EVONNE MIDLINE IN PLACE AND PATENT, 
WITH IVF NS @100ML/HR. TOLERATING WELL. SMITH CATH IN PLACE DRAINING CLEAR YELLOW URINE. PT 
IS ON TELE MONITOR WITH SR READING. SAFETY MEASURES FOLLOWED. WILL CONTINUE TO MONITOR.

## 2022-05-15 NOTE — NUR
RN NOTE



PT ULTRASOUND TO L ARM FOR DVT DONE WITH + RESULTS OF DVT. PMD MADE AWARE, NO NEW ORDERS. PT 
IS ON ELIQUIS 2.5MG BID.

## 2022-05-15 NOTE — NUR
RN CLOSING NOTES:

eyes open follow me when I am in the room she is nonverbal

NGT in place meds given thru the NGT

incontinent cleaned and repositioned

HX HTN BP elevated but lowered wit lopressor as well as the heart rate medicine effective



pike bed alarm on.

## 2022-05-16 VITALS — DIASTOLIC BLOOD PRESSURE: 80 MMHG | SYSTOLIC BLOOD PRESSURE: 164 MMHG

## 2022-05-16 VITALS — SYSTOLIC BLOOD PRESSURE: 137 MMHG | DIASTOLIC BLOOD PRESSURE: 43 MMHG

## 2022-05-16 VITALS — DIASTOLIC BLOOD PRESSURE: 64 MMHG | SYSTOLIC BLOOD PRESSURE: 148 MMHG

## 2022-05-16 VITALS — SYSTOLIC BLOOD PRESSURE: 148 MMHG | DIASTOLIC BLOOD PRESSURE: 67 MMHG

## 2022-05-16 VITALS — SYSTOLIC BLOOD PRESSURE: 160 MMHG | DIASTOLIC BLOOD PRESSURE: 77 MMHG

## 2022-05-16 VITALS — SYSTOLIC BLOOD PRESSURE: 135 MMHG | DIASTOLIC BLOOD PRESSURE: 59 MMHG

## 2022-05-16 LAB
BASOPHILS # BLD AUTO: 0 K/UL (ref 0–0.2)
BASOPHILS NFR BLD AUTO: 0.1 % (ref 0–2)
BUN SERPL-MCNC: 24 MG/DL (ref 7–18)
CALCIUM SERPL-MCNC: 8.2 MG/DL (ref 8.5–10.1)
CHLORIDE SERPL-SCNC: 105 MMOL/L (ref 98–107)
CO2 SERPL-SCNC: 25 MMOL/L (ref 21–32)
CREAT SERPL-MCNC: 0.7 MG/DL (ref 0.6–1.3)
EOSINOPHIL NFR BLD AUTO: 0.7 % (ref 0–6)
GLUCOSE SERPL-MCNC: 116 MG/DL (ref 74–106)
HCT VFR BLD AUTO: 38 % (ref 33–45)
HGB BLD-MCNC: 12.5 G/DL (ref 11.5–14.8)
LYMPHOCYTES NFR BLD AUTO: 0.2 K/UL (ref 0.8–4.8)
LYMPHOCYTES NFR BLD AUTO: 2.9 % (ref 20–44)
MAGNESIUM SERPL-MCNC: 1.8 MG/DL (ref 1.8–2.4)
MCHC RBC AUTO-ENTMCNC: 33 G/DL (ref 31–36)
MCV RBC AUTO: 80 FL (ref 82–100)
MONOCYTES NFR BLD AUTO: 0.4 K/UL (ref 0.1–1.3)
MONOCYTES NFR BLD AUTO: 5.9 % (ref 2–12)
NEUTROPHILS # BLD AUTO: 5.4 K/UL (ref 1.8–8.9)
NEUTROPHILS NFR BLD AUTO: 90.4 % (ref 43–81)
PHOSPHATE SERPL-MCNC: 4.2 MG/DL (ref 2.5–4.9)
PLATELET # BLD AUTO: 201 K/UL (ref 150–450)
POTASSIUM SERPL-SCNC: 3.1 MMOL/L (ref 3.5–5.1)
RBC # BLD AUTO: 4.75 MIL/UL (ref 4–5.2)
SODIUM SERPL-SCNC: 142 MMOL/L (ref 136–145)
WBC NRBC COR # BLD AUTO: 6 K/UL (ref 4.3–11)

## 2022-05-16 PROCEDURE — 05HB33Z INSERTION OF INFUSION DEVICE INTO RIGHT BASILIC VEIN, PERCUTANEOUS APPROACH: ICD-10-PCS | Performed by: NURSE PRACTITIONER

## 2022-05-16 RX ADMIN — APIXABAN SCH MG: 2.5 TABLET, FILM COATED ORAL at 10:51

## 2022-05-16 RX ADMIN — DEXTROSE MONOHYDRATE PRN MG: 50 INJECTION, SOLUTION INTRAVENOUS at 00:46

## 2022-05-16 RX ADMIN — SODIUM CHLORIDE PRN MLS/HR: 9 INJECTION, SOLUTION INTRAVENOUS at 05:09

## 2022-05-16 RX ADMIN — SODIUM CHLORIDE PRN MLS/HR: 9 INJECTION, SOLUTION INTRAVENOUS at 17:29

## 2022-05-16 RX ADMIN — METOPROLOL TARTRATE SCH MG: 50 TABLET, FILM COATED ORAL at 13:00

## 2022-05-16 RX ADMIN — METOPROLOL TARTRATE SCH MG: 50 TABLET, FILM COATED ORAL at 16:09

## 2022-05-16 RX ADMIN — PANTOPRAZOLE SODIUM SCH MG: 40 GRANULE, DELAYED RELEASE ORAL at 10:06

## 2022-05-16 RX ADMIN — AMIODARONE HYDROCHLORIDE SCH MG: 200 TABLET ORAL at 10:07

## 2022-05-16 RX ADMIN — METOPROLOL TARTRATE SCH MG: 50 TABLET, FILM COATED ORAL at 10:07

## 2022-05-16 RX ADMIN — AMIODARONE HYDROCHLORIDE SCH MG: 200 TABLET ORAL at 21:32

## 2022-05-16 NOTE — NUR
TELE/RN CLOSING NOTES



Pt in bed obtunded, eyes open. On 2L NC, tolerating well; no s/s of resp distress, no SOB, 
non-labored breathing, appears comfortable. Pt attached to telemonitor with SR 68 reading. 
New IV access on Right Upper arm midline is intact and patent with NS running at 100 ml/hr; 
no s/s of infiltration, flushes well without resistance. NGT remains at 70 cm in left nare. 
Bilateral soft wrist restraints OFF for now; Bed in lowest position, call light within 
reach, side rails up x3. Kept bilateral upper extremities elevated with pillows.  All needs 
met. Will endorse to the next shift for MAYE.

## 2022-05-16 NOTE — NUR
RN Note

Pt noted to have small amount of yellow emesis. Pt HOB elevated and suctioned. Pt given 
Zofran 4 mg IVP PRN. Will monitor for effectiveness.

## 2022-05-16 NOTE — NUR
Tele RN Closing Note

Pt in bed obtunded, eyes open, slept intermittently throughout the night. Continues to be on 
2L NC, tolerating well; no s/s of resp distress, no SOB, non-labored breathing, appears 
comfortable. Pt attached to external monitor; remained SR throughout the night with HR high 
60s-low 70s. VSS. MIKE 22G intact and patent with NS running at 100 ml/hr; no s/s of 
infiltration, flushes well without resistance. NGT remains at 70 cm in left nare. Pt had no 
episode of emesis after administering zofran 4mg at 0046. Bilateral soft wrist restraints 
continue to be on; no s/s of impaired circulation or impaired skin. Bed in lowest position, 
call light within reach, side rails up x3. Will endorse to dayshift nurse to continue care.

## 2022-05-16 NOTE — NUR
TELE/RN OPENING NOTES



Pt in bed obtunded, eyes open. On 2L NC, tolerating well; no s/s of resp distress, no SOB, 
non-labored breathing, appears comfortable. Pt attached to telemonitor. MIKE 22G intact and 
patent with NS running at 100 ml/hr; no s/s of infiltration, flushes well without 
resistance. NGT remains at 70 cm in left nare. Bilateral soft wrist restraints continue to 
be on; no s/s of impaired circulation or impaired skin. Bed in lowest position, call light 
within reach, side rails up x3. Will continue with the plan of care.

## 2022-05-16 NOTE — NUR
TELE RN OPENING NOTES 



PATIENT IN BED WITH EYES CLOSED, PATIENT OBTUNDED, NON-VERBAL, BUT OPENS EYES. PT STABLE ON 
2 LPM OF OXYGEN VIA NASAL CANNULA, NO S/S OF RESPIRATORY DISTRESS OR SOB NOTED, BREATHING 
EVEN AND UNLABORED, PATIENT SUCTIONED. PATIENT HAS NG TUBE IN PLACE FOR MEDS, PT NPO EXCEPT 
FOR MEDS. IV ACCESS - MIKE MIDLINE INTACT AND INFUSING NS @ 100 ML/HR. SMITH CATHETER IN 
PLACE AND DRAINING YELLOW URINE. SAFETY MEASURES IN PLACE: CALL LIGHT WITHIN REACH, SIDE 
RAILS UP X 3, BED LOCKED IN LOWEST POSITION, BED ALARM ON. WILL CONTINUE TO MONITOR PATIENT

## 2022-05-17 VITALS — SYSTOLIC BLOOD PRESSURE: 150 MMHG | DIASTOLIC BLOOD PRESSURE: 90 MMHG

## 2022-05-17 VITALS — SYSTOLIC BLOOD PRESSURE: 173 MMHG | DIASTOLIC BLOOD PRESSURE: 71 MMHG

## 2022-05-17 VITALS — DIASTOLIC BLOOD PRESSURE: 72 MMHG | SYSTOLIC BLOOD PRESSURE: 158 MMHG

## 2022-05-17 VITALS — SYSTOLIC BLOOD PRESSURE: 172 MMHG | DIASTOLIC BLOOD PRESSURE: 81 MMHG

## 2022-05-17 VITALS — DIASTOLIC BLOOD PRESSURE: 59 MMHG | SYSTOLIC BLOOD PRESSURE: 145 MMHG

## 2022-05-17 VITALS — DIASTOLIC BLOOD PRESSURE: 78 MMHG | SYSTOLIC BLOOD PRESSURE: 159 MMHG

## 2022-05-17 LAB
BASE EXCESS BLDA CALC-SCNC: -0.8 MMOL/L
BASOPHILS # BLD AUTO: 0 K/UL (ref 0–0.2)
BASOPHILS NFR BLD AUTO: 0.1 % (ref 0–2)
BUN SERPL-MCNC: 24 MG/DL (ref 7–18)
CALCIUM SERPL-MCNC: 8.4 MG/DL (ref 8.5–10.1)
CHLORIDE SERPL-SCNC: 108 MMOL/L (ref 98–107)
CO2 SERPL-SCNC: 25 MMOL/L (ref 21–32)
CREAT SERPL-MCNC: 0.6 MG/DL (ref 0.6–1.3)
EOSINOPHIL NFR BLD AUTO: 1.1 % (ref 0–6)
GLUCOSE SERPL-MCNC: 106 MG/DL (ref 74–106)
HCT VFR BLD AUTO: 40 % (ref 33–45)
HGB BLD-MCNC: 12.6 G/DL (ref 11.5–14.8)
INHALED O2 CONCENTRATION: 28 %
INHALED O2 FLOW RATE: 2 L/MIN (ref 0–30)
LYMPHOCYTES NFR BLD AUTO: 0.3 K/UL (ref 0.8–4.8)
LYMPHOCYTES NFR BLD AUTO: 4.7 % (ref 20–44)
MAGNESIUM SERPL-MCNC: 1.7 MG/DL (ref 1.8–2.4)
MCHC RBC AUTO-ENTMCNC: 32 G/DL (ref 31–36)
MCV RBC AUTO: 82 FL (ref 82–100)
MONOCYTES NFR BLD AUTO: 0.5 K/UL (ref 0.1–1.3)
MONOCYTES NFR BLD AUTO: 6.4 % (ref 2–12)
NEUTROPHILS # BLD AUTO: 6.3 K/UL (ref 1.8–8.9)
NEUTROPHILS NFR BLD AUTO: 87.7 % (ref 43–81)
PCO2 TEMP ADJ BLDA: 38.9 MMHG (ref 35–45)
PH TEMP ADJ BLDA: 7.4 [PH] (ref 7.35–7.45)
PHOSPHATE SERPL-MCNC: 2.8 MG/DL (ref 2.5–4.9)
PLATELET # BLD AUTO: 188 K/UL (ref 150–450)
PO2 TEMP ADJ BLDA: 78.1 MMHG (ref 75–100)
POTASSIUM SERPL-SCNC: 3.4 MMOL/L (ref 3.5–5.1)
RBC # BLD AUTO: 4.84 MIL/UL (ref 4–5.2)
SODIUM SERPL-SCNC: 143 MMOL/L (ref 136–145)
VENTILATION MODE VENT: (no result)
WBC NRBC COR # BLD AUTO: 7.2 K/UL (ref 4.3–11)

## 2022-05-17 PROCEDURE — 0DH63UZ INSERTION OF FEEDING DEVICE INTO STOMACH, PERCUTANEOUS APPROACH: ICD-10-PCS | Performed by: INTERNAL MEDICINE

## 2022-05-17 RX ADMIN — ACETYLCYSTEINE SCH MG: 100 INHALANT RESPIRATORY (INHALATION) at 23:08

## 2022-05-17 RX ADMIN — MAGNESIUM SULFATE IN DEXTROSE SCH MLS/HR: 10 INJECTION, SOLUTION INTRAVENOUS at 14:03

## 2022-05-17 RX ADMIN — POTASSIUM CHLORIDE SCH MLS/HR: 200 INJECTION, SOLUTION INTRAVENOUS at 12:06

## 2022-05-17 RX ADMIN — MAGNESIUM SULFATE IN DEXTROSE SCH MLS/HR: 10 INJECTION, SOLUTION INTRAVENOUS at 15:31

## 2022-05-17 RX ADMIN — PANTOPRAZOLE SODIUM SCH MG: 40 GRANULE, DELAYED RELEASE ORAL at 09:00

## 2022-05-17 RX ADMIN — LISINOPRIL SCH MG: 10 TABLET ORAL at 15:32

## 2022-05-17 RX ADMIN — METOPROLOL TARTRATE SCH MG: 50 TABLET, FILM COATED ORAL at 16:36

## 2022-05-17 RX ADMIN — ALBUTEROL SULFATE SCH MG: 2.5 SOLUTION RESPIRATORY (INHALATION) at 23:08

## 2022-05-17 RX ADMIN — AMIODARONE HYDROCHLORIDE SCH MG: 200 TABLET ORAL at 12:05

## 2022-05-17 RX ADMIN — AMIODARONE HYDROCHLORIDE SCH MG: 200 TABLET ORAL at 21:39

## 2022-05-17 RX ADMIN — METOPROLOL TARTRATE SCH MG: 50 TABLET, FILM COATED ORAL at 12:06

## 2022-05-17 RX ADMIN — ACETYLCYSTEINE SCH MG: 100 INHALANT RESPIRATORY (INHALATION) at 15:37

## 2022-05-17 RX ADMIN — POTASSIUM CHLORIDE SCH MLS/HR: 200 INJECTION, SOLUTION INTRAVENOUS at 13:10

## 2022-05-17 RX ADMIN — SODIUM CHLORIDE PRN MLS/HR: 9 INJECTION, SOLUTION INTRAVENOUS at 07:36

## 2022-05-17 RX ADMIN — METOPROLOL TARTRATE SCH MG: 50 TABLET, FILM COATED ORAL at 09:00

## 2022-05-17 RX ADMIN — ALBUTEROL SULFATE SCH MG: 2.5 SOLUTION RESPIRATORY (INHALATION) at 15:37

## 2022-05-17 NOTE — NUR
TELE RN NOTE 

PT IN BED AWAKE AND NON VERBAL, SLOWLY FOLLOWS COMMAND. BOTH EYES ARE OPEN ON 2 L NC. SAT 
96%. NOTED WITH CHEST CONGESTION, RT AT BEDSIDE, NASOPHARYNGEAL WITH LARGE AMT OF THICK 
SECRETION,  ON TELE MONDOR SR HR 81 ORAL CARE DONE ,  ON N G TUBE  IN PLACE AND PLACEMENT 
CHECKED BY AUSCULTATION OF AIR  , ON NPO STATUS ,  RT UPPER ARM MID LINE ON IVF AS ORDERED , 
BED IN LOWEST AND LOCKED POSITION, CALL LIGHT WITHIN REACH WITH SMITH CATH TO GRAVITY  WITH 
YELLOW COLOR URINE NOTED

## 2022-05-17 NOTE — NUR
tele rn note 

 noted chest with congestion , frequent oral suction done , called to dr clark with order 
hold ivf and stat  abg will f\u

## 2022-05-17 NOTE — NUR
tele rn note 

 patient in bed , awake bur nonverbal with confusion ,nasopharyngeal suction done large amt 
of  white yellow thick secretion noted, keep hob elevated, with 5l m 28% by cooler aerosol , 
  bed in lowest and locked position , with Bartholomew cath to gravity  call light within reach 
,will cont to monitor closely

## 2022-05-17 NOTE — NUR
tele rn note 

 abg result reported by rt to dr eduardo gay to plae simple mask with cooler aerosol 5l 
saturation 97% mouth care done as ordered ,all needs attended

## 2022-05-17 NOTE — NUR
LESA RN NOTE 

 BACK FROM SURGERY RESTING COMFORTABLY OPEN BOTH EYES TO VERBAL AND TA TILE STIMULI SON AT 
BEDSIDE G TUBE IN PLACED, KEEP CLEAN DRY, /78 SATURATION 96% AT THIS TIME, PER DR REKHA KIMBLE TO START G TUBE FEEDING NPO EXCEPT MEDS 

-------------------------------------------------------------------------------

Addendum: 05/17/22 at 1712 by ROWDY TAN RN

-------------------------------------------------------------------------------

OK TO  START G TUBE FEEDING TOMORROW , NPO EXCEPT MEDS

## 2022-05-17 NOTE — NUR
tele rn note 

 per dietary ok to start Jevity1.2 at 20 ml per hour .per dr albrecht  gi doctor ok to start 
g tube feeding tomorrow

## 2022-05-17 NOTE — NUR
TELE RN NOTE

 WILL HOLD ALL MEDS AT THIS TIME PATIENT NPO ,WILL HAVE PEG PLACEMENT , SURGICAL PROCEDURE, 
TAKEN TO OR

## 2022-05-17 NOTE — NUR
RN NOTE



RECEIVED PT ON COOL AEROSOL AT 5L 28%. O2 SAT AT 95%. NOT IN ANY DISTRESS, CONGESTION NOTED. 
KEPT HOB ELEVATED. SR ON TELE MONITOR WITH HR 74/. PEG IN PLACE, PATENT. NO BLEEDING NOTED. 
TO START GT FEEDING IN AM. SMITH DRAINING CLEAR YELLOW URINE. WILL CONTINUE TO MONITOR.

## 2022-05-17 NOTE — NUR
TELE RN CLOSING NOTES 



PATIENT IN BED WITH EYES CLOSED, PATIENT OBTUNDED, NON-VERBAL, BUT OPENS EYES. PT STABLE ON 
2 LPM OF OXYGEN VIA NASAL CANNULA, NO S/S OF RESPIRATORY DISTRESS OR SOB NOTED,  PATIENT 
SUCTIONED THROUGHOUT SHIFT D/T GURGLING SOUNDS. PATIENT HAS NG TUBE IN PLACE FOR MEDS, PT 
NPO EXCEPT FOR MEDS. IV ACCESS - MIKE MIDLINE INTACT AND INFUSING NS @ 100 ML/HR. SMITH 
CATHETER IN PLACE AND DRAINING YELLOW URINE. MEDICATIONS GIVEN AS ORDERED, PT NEEDS MET 
THROUGHOUT SHIFT, PATIENT TURNED AND REPOSITIONED QQ2H. SAFETY MEASURES IN PLACE: CALL LIGHT 
WITHIN REACH, SIDE RAILS UP X 3, BED LOCKED IN LOWEST POSITION, BED ALARM ON. ENDORSED TO 
DAY SHIFT NURSE FOR CONTINUITY OF CARE

## 2022-05-18 VITALS — DIASTOLIC BLOOD PRESSURE: 63 MMHG | SYSTOLIC BLOOD PRESSURE: 149 MMHG

## 2022-05-18 VITALS — SYSTOLIC BLOOD PRESSURE: 155 MMHG | DIASTOLIC BLOOD PRESSURE: 63 MMHG

## 2022-05-18 VITALS — DIASTOLIC BLOOD PRESSURE: 60 MMHG | SYSTOLIC BLOOD PRESSURE: 105 MMHG

## 2022-05-18 VITALS — DIASTOLIC BLOOD PRESSURE: 59 MMHG | SYSTOLIC BLOOD PRESSURE: 143 MMHG

## 2022-05-18 VITALS — DIASTOLIC BLOOD PRESSURE: 58 MMHG | SYSTOLIC BLOOD PRESSURE: 136 MMHG

## 2022-05-18 VITALS — SYSTOLIC BLOOD PRESSURE: 155 MMHG | DIASTOLIC BLOOD PRESSURE: 61 MMHG

## 2022-05-18 LAB
BUN SERPL-MCNC: 16 MG/DL (ref 7–18)
CALCIUM SERPL-MCNC: 8.4 MG/DL (ref 8.5–10.1)
CHLORIDE SERPL-SCNC: 104 MMOL/L (ref 98–107)
CO2 SERPL-SCNC: 31 MMOL/L (ref 21–32)
CREAT SERPL-MCNC: 0.5 MG/DL (ref 0.6–1.3)
GLUCOSE SERPL-MCNC: 112 MG/DL (ref 74–106)
MAGNESIUM SERPL-MCNC: 2.1 MG/DL (ref 1.8–2.4)
POTASSIUM SERPL-SCNC: 3.4 MMOL/L (ref 3.5–5.1)
SODIUM SERPL-SCNC: 143 MMOL/L (ref 136–145)

## 2022-05-18 RX ADMIN — Medication PRN ML: at 09:27

## 2022-05-18 RX ADMIN — METOPROLOL TARTRATE SCH MG: 50 TABLET, FILM COATED ORAL at 16:43

## 2022-05-18 RX ADMIN — METOPROLOL TARTRATE SCH MG: 50 TABLET, FILM COATED ORAL at 12:22

## 2022-05-18 RX ADMIN — AMIODARONE HYDROCHLORIDE SCH MG: 200 TABLET ORAL at 08:28

## 2022-05-18 RX ADMIN — ACETYLCYSTEINE SCH MG: 100 INHALANT RESPIRATORY (INHALATION) at 08:56

## 2022-05-18 RX ADMIN — ALBUTEROL SULFATE SCH MG: 2.5 SOLUTION RESPIRATORY (INHALATION) at 23:08

## 2022-05-18 RX ADMIN — APIXABAN SCH MG: 2.5 TABLET, FILM COATED ORAL at 10:58

## 2022-05-18 RX ADMIN — ALBUTEROL SULFATE SCH MG: 2.5 SOLUTION RESPIRATORY (INHALATION) at 15:20

## 2022-05-18 RX ADMIN — POTASSIUM CHLORIDE SCH MLS/HR: 200 INJECTION, SOLUTION INTRAVENOUS at 11:31

## 2022-05-18 RX ADMIN — POTASSIUM CHLORIDE SCH MLS/HR: 200 INJECTION, SOLUTION INTRAVENOUS at 09:27

## 2022-05-18 RX ADMIN — LISINOPRIL SCH MG: 10 TABLET ORAL at 08:29

## 2022-05-18 RX ADMIN — ACETYLCYSTEINE SCH MG: 100 INHALANT RESPIRATORY (INHALATION) at 23:08

## 2022-05-18 RX ADMIN — AMIODARONE HYDROCHLORIDE SCH MG: 200 TABLET ORAL at 21:39

## 2022-05-18 RX ADMIN — POTASSIUM CHLORIDE SCH MLS/HR: 200 INJECTION, SOLUTION INTRAVENOUS at 10:31

## 2022-05-18 RX ADMIN — PIPERACILLIN SODIUM AND TAZOBACTAM SODIUM SCH MLS/HR: .375; 3 INJECTION, POWDER, LYOPHILIZED, FOR SOLUTION INTRAVENOUS at 23:21

## 2022-05-18 RX ADMIN — ALBUTEROL SULFATE SCH MG: 2.5 SOLUTION RESPIRATORY (INHALATION) at 08:56

## 2022-05-18 RX ADMIN — PANTOPRAZOLE SODIUM SCH MG: 40 GRANULE, DELAYED RELEASE ORAL at 08:28

## 2022-05-18 RX ADMIN — APIXABAN SCH MG: 2.5 TABLET, FILM COATED ORAL at 16:45

## 2022-05-18 RX ADMIN — POTASSIUM CHLORIDE SCH MLS/HR: 200 INJECTION, SOLUTION INTRAVENOUS at 13:31

## 2022-05-18 RX ADMIN — METOPROLOL TARTRATE SCH MG: 50 TABLET, FILM COATED ORAL at 08:29

## 2022-05-18 RX ADMIN — ACETYLCYSTEINE SCH MG: 100 INHALANT RESPIRATORY (INHALATION) at 15:20

## 2022-05-18 NOTE — NUR
RN OPENING NOTES



RECEIVED PATIENT ON BED, OBTUNDED, OPEN EYES. ON COOL AEROSOL @ 5LPM, 28%, RESPIRATORY EVEN 
AND UNLABORED, NO SOB NOTED, NOT IN ACUTE DISTRESS. REMAIN AFEBRILE. NOTED WITH MIKE MID 
LINE, PATENT, INTACT. FLUSHED WITH NS, NO S/S OF INFILTRATION NOTED AT SITE. WITH PEG TUBE, 
PATENT INTACT, NO HYPERGRANULATION NOTED AT SITE. VERIFIED PLACEMENT BY AUSCULTATION, NO 
RESIDUAL NOTED UPON ASPIRATION, CURRENTLY RUNNING WITH [employer] 1.2 @ 20 ML/HR. WITH GOAL OF 
50 ML/HR. ON SMITH CATHETER PATENT, INTACT DRAINING WITH CLEAR YELLOW URINE VIA GRAVITY. ALL 
SAFETY  MEASURE PROVIDED. BED IN LOWEST POSITION, LOCKED. BED ALARM ARMED. CALL LIGHT WITH 
IN REACH. CONTINUE TO MONITOR.

## 2022-05-18 NOTE — NUR
RN OPENING NOTES 



PATIENT IN BED WITH EYES CLOSED, PATIENT OBTUNDED, NON-VERBAL, BUT OPENS EYES. PATIENT ON 5L 
COOL AEROSOL VIA MASK. NO S/S OF RESPIRATORY DISTRESS NOTED. PATIENT SUCTIONED. PATIENT WITH 
G-TUBE IN PLACE. IV ACCESS  RIGHT UPPER ARM MIDLINE INTACT AND PATENT TKO. SMITH CATHETER IN 
PLACE AND DRAINING YELLOW URINE. SAFETY MEASURES IN PLACE: CALL LIGHT WITHIN REACH, SIDE 
RAILS UP X 3, BED LOCKED IN LOWEST POSITION, BED ALARM ON. WILL CONTINUE TO MONITOR PATIENT

## 2022-05-18 NOTE — NUR
RN NOTE



FREQUENT MONITORING, PT TOLERATING O2, O2 SAT AT 94%. SUCTIONED AS NEEDED. NO DISTRESS 
NOTED. ORAL CARE DONE.

## 2022-05-18 NOTE — NUR
RN CLOSING NOTES



PATIENT IN BED WITH EYES CLOSED, PATIENT OBTUNDED, NON-VERBAL, BUT OPENS EYES. PATIENT 
REMAINS ON 5L COOL AEROSOL VIA MASK. NO S/S OF RESPIRATORY DISTRESS NOTED. PATIENT SUCTIONED 
FREQUENTLY. ON TELE MONITOR, CURRENTLY SHOWING SINUS RHYTHM, HR AT 71. WITH G-TUBE IN PLACE, 
STARTED ON GTF OF JEVITY 1.2 AT 20ML/HR, TOLERATED WELL. ASPIRATION PRECAUTIONS OBSERVED. IV 
ACCESS  RIGHT UPPER ARM MIDLINE INTACT AND PATENT, ON TKO. SMITH CATHETER IN PLACE AND 
DRAINING YELLOW URINE. SAFETY MEASURES IN PLACE: CALL LIGHT WITHIN REACH, SIDE RAILS UP X 3, 
BED LOCKED IN LOWEST POSITION, BED ALARM ON. WILL ENDORSE TO NEXT SHIFT FOR CONTINUITY OF 
CARE.

## 2022-05-18 NOTE — NUR
RN NOTES



PATIENT NOTED WITH 85% OXYGEN SATURATION, REPOSITION PATIENT, SUCTION DONE, OXYGEN 
SATURATION NOT IMPROVING, NOTIFIED RT. CHARGE NURSE AT BED SIDE.

## 2022-05-18 NOTE — NUR
RN NOTES



CT CHEST RESULTS RELAYED TO JOÃO MACHADO AT THIS TIME. RESULTS ALSO FORWARDED TO DR. COOPER.

## 2022-05-18 NOTE — NUR
RN NOTE



PT REMAIN ON 5L COOL AEROSOL. TOLERATING WELL. NO SIGNS OF DISTRESS WERE NOTED. KEPT HOB 
ELEVATED. CHEST CONGESTION STILL NOTED, SUCTIONED PRN, WITH MOD AMT OF THICK SECRETIONS. SR 
ON TELE MONITOR WITH HR OF 76. PEGTUBE REMAIN IN PLACE. WILL ENDORSE TO NEXT SHIFT NURSE FOR 
MAYE.

## 2022-05-19 VITALS — DIASTOLIC BLOOD PRESSURE: 61 MMHG | SYSTOLIC BLOOD PRESSURE: 117 MMHG

## 2022-05-19 VITALS — SYSTOLIC BLOOD PRESSURE: 124 MMHG | DIASTOLIC BLOOD PRESSURE: 61 MMHG

## 2022-05-19 VITALS — DIASTOLIC BLOOD PRESSURE: 51 MMHG | SYSTOLIC BLOOD PRESSURE: 115 MMHG

## 2022-05-19 VITALS — SYSTOLIC BLOOD PRESSURE: 118 MMHG | DIASTOLIC BLOOD PRESSURE: 60 MMHG

## 2022-05-19 VITALS — SYSTOLIC BLOOD PRESSURE: 106 MMHG | DIASTOLIC BLOOD PRESSURE: 65 MMHG

## 2022-05-19 VITALS — DIASTOLIC BLOOD PRESSURE: 63 MMHG | SYSTOLIC BLOOD PRESSURE: 131 MMHG

## 2022-05-19 LAB
BASE EXCESS BLDA CALC-SCNC: 5.5 MMOL/L
BASOPHILS # BLD AUTO: 0 K/UL (ref 0–0.2)
BASOPHILS NFR BLD AUTO: 0 % (ref 0–2)
BUN SERPL-MCNC: 14 MG/DL (ref 7–18)
CALCIUM SERPL-MCNC: 8 MG/DL (ref 8.5–10.1)
CHLORIDE SERPL-SCNC: 103 MMOL/L (ref 98–107)
CO2 SERPL-SCNC: 32 MMOL/L (ref 21–32)
CREAT SERPL-MCNC: 0.5 MG/DL (ref 0.6–1.3)
DO-HGB MFR BLDA: 85.3 MMHG
EOSINOPHIL NFR BLD AUTO: 1 % (ref 0–6)
GLUCOSE SERPL-MCNC: 136 MG/DL (ref 74–106)
HCT VFR BLD AUTO: 32 % (ref 33–45)
HGB BLD-MCNC: 10.6 G/DL (ref 11.5–14.8)
INHALED O2 CONCENTRATION: 28 %
LYMPHOCYTES NFR BLD AUTO: 0.3 K/UL (ref 0.8–4.8)
LYMPHOCYTES NFR BLD AUTO: 2.9 % (ref 20–44)
MAGNESIUM SERPL-MCNC: 1.8 MG/DL (ref 1.8–2.4)
MCHC RBC AUTO-ENTMCNC: 33 G/DL (ref 31–36)
MCV RBC AUTO: 80 FL (ref 82–100)
MONOCYTES NFR BLD AUTO: 0.5 K/UL (ref 0.1–1.3)
MONOCYTES NFR BLD AUTO: 5.3 % (ref 2–12)
NEUTROPHILS # BLD AUTO: 8.9 K/UL (ref 1.8–8.9)
NEUTROPHILS NFR BLD AUTO: 90.8 % (ref 43–81)
PCO2 TEMP ADJ BLDA: 48.3 MMHG (ref 35–45)
PH TEMP ADJ BLDA: 7.42 [PH] (ref 7.35–7.45)
PHOSPHATE SERPL-MCNC: 2.2 MG/DL (ref 2.5–4.9)
PLATELET # BLD AUTO: 167 K/UL (ref 150–450)
PO2 TEMP ADJ BLDA: 57.3 MMHG (ref 75–100)
POTASSIUM SERPL-SCNC: 3.7 MMOL/L (ref 3.5–5.1)
RBC # BLD AUTO: 4 MIL/UL (ref 4–5.2)
SAO2 % BLDA: 88.8 % (ref 92–98.5)
SODIUM SERPL-SCNC: 139 MMOL/L (ref 136–145)
VENTILATION MODE VENT: (no result)
WBC NRBC COR # BLD AUTO: 9.8 K/UL (ref 4.3–11)

## 2022-05-19 RX ADMIN — ACETAMINOPHEN PRN MG: 160 SOLUTION ORAL at 11:28

## 2022-05-19 RX ADMIN — ALBUTEROL SULFATE SCH MG: 2.5 SOLUTION RESPIRATORY (INHALATION) at 07:40

## 2022-05-19 RX ADMIN — PIPERACILLIN SODIUM AND TAZOBACTAM SODIUM SCH MLS/HR: .375; 3 INJECTION, POWDER, LYOPHILIZED, FOR SOLUTION INTRAVENOUS at 04:32

## 2022-05-19 RX ADMIN — APIXABAN SCH MG: 2.5 TABLET, FILM COATED ORAL at 09:34

## 2022-05-19 RX ADMIN — ACETYLCYSTEINE SCH MG: 100 INHALANT RESPIRATORY (INHALATION) at 23:26

## 2022-05-19 RX ADMIN — METOPROLOL TARTRATE SCH MG: 50 TABLET, FILM COATED ORAL at 09:35

## 2022-05-19 RX ADMIN — LISINOPRIL SCH MG: 10 TABLET ORAL at 09:35

## 2022-05-19 RX ADMIN — AMIODARONE HYDROCHLORIDE SCH MG: 200 TABLET ORAL at 09:40

## 2022-05-19 RX ADMIN — APIXABAN SCH MG: 2.5 TABLET, FILM COATED ORAL at 17:20

## 2022-05-19 RX ADMIN — FERROUS SULFATE TAB 325 MG (65 MG ELEMENTAL FE) SCH MG: 325 (65 FE) TAB at 09:34

## 2022-05-19 RX ADMIN — ACETYLCYSTEINE SCH MG: 100 INHALANT RESPIRATORY (INHALATION) at 15:07

## 2022-05-19 RX ADMIN — ACETAMINOPHEN PRN MG: 160 SOLUTION ORAL at 22:24

## 2022-05-19 RX ADMIN — METOPROLOL TARTRATE SCH MG: 50 TABLET, FILM COATED ORAL at 17:06

## 2022-05-19 RX ADMIN — DONEPEZIL HYDROCHLORIDE SCH MG: 5 TABLET ORAL at 22:22

## 2022-05-19 RX ADMIN — AMIODARONE HYDROCHLORIDE SCH MG: 200 TABLET ORAL at 20:14

## 2022-05-19 RX ADMIN — ALBUTEROL SULFATE SCH MG: 2.5 SOLUTION RESPIRATORY (INHALATION) at 23:26

## 2022-05-19 RX ADMIN — METOPROLOL TARTRATE SCH MG: 50 TABLET, FILM COATED ORAL at 12:24

## 2022-05-19 RX ADMIN — PIPERACILLIN SODIUM AND TAZOBACTAM SODIUM SCH MLS/HR: .375; 3 INJECTION, POWDER, LYOPHILIZED, FOR SOLUTION INTRAVENOUS at 09:38

## 2022-05-19 RX ADMIN — FERROUS SULFATE TAB 325 MG (65 MG ELEMENTAL FE) SCH MG: 325 (65 FE) TAB at 17:06

## 2022-05-19 RX ADMIN — PANTOPRAZOLE SODIUM SCH MG: 40 GRANULE, DELAYED RELEASE ORAL at 09:33

## 2022-05-19 RX ADMIN — PIPERACILLIN SODIUM AND TAZOBACTAM SODIUM SCH MLS/HR: .375; 3 INJECTION, POWDER, LYOPHILIZED, FOR SOLUTION INTRAVENOUS at 17:57

## 2022-05-19 RX ADMIN — ALBUTEROL SULFATE SCH MG: 2.5 SOLUTION RESPIRATORY (INHALATION) at 15:07

## 2022-05-19 RX ADMIN — AMITRIPTYLINE HYDROCHLORIDE SCH MG: 25 TABLET, FILM COATED ORAL at 17:29

## 2022-05-19 RX ADMIN — ACETYLCYSTEINE SCH MG: 100 INHALANT RESPIRATORY (INHALATION) at 07:40

## 2022-05-19 RX ADMIN — FERROUS SULFATE TAB 325 MG (65 MG ELEMENTAL FE) SCH MG: 325 (65 FE) TAB at 12:24

## 2022-05-19 RX ADMIN — DEXTROSE MONOHYDRATE PRN MG: 50 INJECTION, SOLUTION INTRAVENOUS at 15:30

## 2022-05-19 NOTE — NUR
TELE RN NOTE 

 SPOKE WITH DR ÁLVAREZ NOTIFIED THAT NOTED BLEEDING DISCHARGE  WHILE SUCTION ,PATIENT ON 
ELIQUIS,   STATED OK TO GIVE

## 2022-05-19 NOTE — NUR
TELE RN NOTE 

 DR COOPER AT BEDSIDE UPDATED PATIENT CONDITION STATED  TO DO FREQUENT SUCTION AND CARE

## 2022-05-19 NOTE — NUR
TELE RN NOTE 

 DR ÁLVAREZ CARDIOLOGIST  AT BEDSIDE UPDATED PATIENT CONDITION AND ORDERED LASIX 40 MG IV NOW, 
ALSO ABG RESULT NOTIFIED

## 2022-05-19 NOTE — NUR
RN NOTES



PATIENT REMAIN STABLE THROUGH OUT THE SHIFT. RESPIRATORY EVEN AND UNLABORED, NO SOB NOTED, 
NOT IN ACUTE DISTRESS. REMAIN AFEBRILE. WITH PEG TUBE, PATENT INTACT, NO HYPERGRANULATION 
NOTED AT SITE. VERIFIED PLACEMENT BY AUSCULTATION, NO RESIDUAL NOTED UPON ASPIRATION, 
CURRENTLY RUNNING WITH JEVITY 1.2 @ 20 ML/HR. WITH GOAL OF 50 ML/HR. HEAD OF BED KEPT 
ELEVATED. ON SMITH CATHETER PATENT, INTACT DRAINING WITH CLEAR YELLOW URINE VIA GRAVITY. ALL 
DUE MEDS GIVEN AS ORDERED.  ALL SAFETY  MEASURE PROVIDED. BED IN LOWEST POSITION, LOCKED. 
BED ALARM ARMED. CALL LIGHT WITH IN REACH. CONTINUE TO MONITOR.

## 2022-05-19 NOTE — NUR
TELE RN NOTES

NOTED ON TELE MONITOR HEART RATE -150 , CALLED DR PINA ,ORDERED START EKG , RT 
NOTIFIED.

## 2022-05-19 NOTE — NUR
TELE RN NOTE 

DR COOPER NOTIFIED THAT PATIENT IS MORE CONGESTED AND MOANS A LOT POSABLE HAS PAIN , DR COOPER 
ORDERED ABG AND TORADOL ,CHEM 7 FOR TOMORROW,ORDER CARRIED OUT

## 2022-05-19 NOTE — NUR
TELE RN NOTE

 NASAL SUCTION DONE BY RT ,LOOK MORE COMFORTABLE AT THIS TIME,  NOT IN PAIN WILL CONT 
MONITOR ,SATURATIO 94% AT THIS TIME

## 2022-05-19 NOTE — NUR
TELE RN NOTE 

 STILL WITH CHEST CONGESTION ,HOLD G TUBE FEEDING, DR COOPER NOTIFIED AT THIS TIME ,WILL 
MONITOR , HR STILL 130, KEEP HOB ELEVATED AT ALL TIME WITH SMITH CATH TO GRAVITY  WITH 
YELLOW COLOR , WILL MONITOR

## 2022-05-19 NOTE — NUR
RN NOTES





INFORMED DR KARIMI FOR HR -160'S. UNCONTROLLED AFIB AMIODARONE 200 MG TAB GIVEN @ 
20:30.

## 2022-05-19 NOTE — NUR
RN NOTES





RECEIVED REPORT FROM MORNING RN. PATIENT IN BED OBTUNDED. ON COOL AEROSOL AT 10 LPM SATING 
96%. WITH IV ACCESS AT MIKE MIDLINE PATENT FLUSHES WELL. WITH GT PATENT FLUSHES WELL 
CONNECTED TO CONTINUOS TF JEVITY @20 ML/HR. WITH SMITH CATHETER CONNECTED TO URINE BAG 
DRAINING YELLOWISH URINE. VITAL SIGNS TAKEN AND RECORDED, , TEMP 99.1. ALL SAFETY 
MEASURES IN PLACE AT ALL TIMES. HOB ELEVATED. CALL LIGHT WITHIN REACH WILL CLOSELY MONITOR 
THE PATIENT

## 2022-05-19 NOTE — NUR
RN NOTES





AMIODARONE BOLUS STARTED. BP CHECK AFTER 5 MINUTES BP 88/40. DOC TREV INFORMED WITH ORDER 
TO HOLD AMIODARONE DRIP AND REPEAT BP AFTER 30 MINUTES

## 2022-05-19 NOTE — NUR
TELE RN NOTE 

 RECEIVED PATIENT IN BED . AWAKE BUT SLOW RESPONSE TO VERBAL AND TACITLY STIMULI,ON TELE  SR 
HR 77  ON COOLER  AEROSOL 5L FIO2 28% OM MASK,SATURATION 94% , ORAL SUCTION OBTAINED LARGE 
AMT OF YELLOW WHITE SECRETION, MOUTH CARE DONE, WITH SMITH CATH TO GRAVITY WITH YELLOW COLOR 
URINE, ON G TUBE FEEDING AS ORDERED, KEEP HOB ELEVATED AT ALL TIME, 

,MIKE MIDLINE IN PLACE AND INTACT ,RT AT BEDSIDE, BREATHING TX DONE, WILL CONT TO MONITOR.BED 
IN LOWEST AND LOCKED POSITION , CALL LIGHT WITHIN REACH , SAFETY MEASURE PROVIDED

## 2022-05-19 NOTE — NUR
TELERN NOTE 

 DR BETANCOURT NOTIFIED ABOUT CHEST CONGESTION,ALSO AWARE T 99.6 CONT WITH CORRECT CARE

-------------------------------------------------------------------------------

Addendum: 05/19/22 at 1905 by ROWDY TAN RN

-------------------------------------------------------------------------------

CONT CURRENT CARE

## 2022-05-19 NOTE — NUR
TELE RN NOTE: 

DR ÁLVAREZ CARDIOLOGIST WAS NOTIFIED REGARDING THE ECG RESULT , A FIB, NO NEW ORDERES AT THIS 
TIME.

## 2022-05-20 VITALS — DIASTOLIC BLOOD PRESSURE: 56 MMHG | SYSTOLIC BLOOD PRESSURE: 102 MMHG

## 2022-05-20 VITALS — DIASTOLIC BLOOD PRESSURE: 69 MMHG | SYSTOLIC BLOOD PRESSURE: 133 MMHG

## 2022-05-20 VITALS — DIASTOLIC BLOOD PRESSURE: 56 MMHG | SYSTOLIC BLOOD PRESSURE: 109 MMHG

## 2022-05-20 VITALS — SYSTOLIC BLOOD PRESSURE: 95 MMHG | DIASTOLIC BLOOD PRESSURE: 51 MMHG

## 2022-05-20 VITALS — DIASTOLIC BLOOD PRESSURE: 51 MMHG | SYSTOLIC BLOOD PRESSURE: 85 MMHG

## 2022-05-20 VITALS — SYSTOLIC BLOOD PRESSURE: 126 MMHG | DIASTOLIC BLOOD PRESSURE: 55 MMHG

## 2022-05-20 LAB
BUN SERPL-MCNC: 17 MG/DL (ref 7–18)
CALCIUM SERPL-MCNC: 8.3 MG/DL (ref 8.5–10.1)
CHLORIDE SERPL-SCNC: 101 MMOL/L (ref 98–107)
CO2 SERPL-SCNC: 30 MMOL/L (ref 21–32)
CREAT SERPL-MCNC: 0.7 MG/DL (ref 0.6–1.3)
GLUCOSE SERPL-MCNC: 128 MG/DL (ref 74–106)
POTASSIUM SERPL-SCNC: 3.7 MMOL/L (ref 3.5–5.1)
SODIUM SERPL-SCNC: 137 MMOL/L (ref 136–145)

## 2022-05-20 RX ADMIN — AMIODARONE HYDROCHLORIDE SCH MG: 200 TABLET ORAL at 20:26

## 2022-05-20 RX ADMIN — FUROSEMIDE SCH MG: 10 INJECTION INTRAVENOUS at 17:52

## 2022-05-20 RX ADMIN — VITAMIN D, TAB 1000IU (100/BT) SCH UNIT: 25 TAB at 09:33

## 2022-05-20 RX ADMIN — ALBUTEROL SULFATE SCH MG: 2.5 SOLUTION RESPIRATORY (INHALATION) at 15:05

## 2022-05-20 RX ADMIN — APIXABAN SCH MG: 2.5 TABLET, FILM COATED ORAL at 17:53

## 2022-05-20 RX ADMIN — PIPERACILLIN SODIUM AND TAZOBACTAM SODIUM SCH MLS/HR: .375; 3 INJECTION, POWDER, LYOPHILIZED, FOR SOLUTION INTRAVENOUS at 17:57

## 2022-05-20 RX ADMIN — FERROUS SULFATE TAB 325 MG (65 MG ELEMENTAL FE) SCH MG: 325 (65 FE) TAB at 17:51

## 2022-05-20 RX ADMIN — ACETYLCYSTEINE SCH MG: 100 INHALANT RESPIRATORY (INHALATION) at 15:05

## 2022-05-20 RX ADMIN — ALBUTEROL SULFATE SCH MG: 2.5 SOLUTION RESPIRATORY (INHALATION) at 07:49

## 2022-05-20 RX ADMIN — FUROSEMIDE SCH MG: 10 INJECTION INTRAVENOUS at 09:00

## 2022-05-20 RX ADMIN — ACETYLCYSTEINE SCH MG: 100 INHALANT RESPIRATORY (INHALATION) at 23:12

## 2022-05-20 RX ADMIN — PANTOPRAZOLE SODIUM SCH MG: 40 GRANULE, DELAYED RELEASE ORAL at 09:33

## 2022-05-20 RX ADMIN — ACETYLCYSTEINE SCH MG: 100 INHALANT RESPIRATORY (INHALATION) at 07:49

## 2022-05-20 RX ADMIN — AMITRIPTYLINE HYDROCHLORIDE SCH MG: 25 TABLET, FILM COATED ORAL at 17:52

## 2022-05-20 RX ADMIN — METOPROLOL TARTRATE SCH MG: 50 TABLET, FILM COATED ORAL at 17:52

## 2022-05-20 RX ADMIN — DONEPEZIL HYDROCHLORIDE SCH MG: 5 TABLET ORAL at 21:31

## 2022-05-20 RX ADMIN — LISINOPRIL SCH MG: 10 TABLET ORAL at 09:00

## 2022-05-20 RX ADMIN — ASPIRIN 81 MG SCH MG: 81 TABLET ORAL at 09:33

## 2022-05-20 RX ADMIN — AMIODARONE HYDROCHLORIDE SCH MG: 200 TABLET ORAL at 09:34

## 2022-05-20 RX ADMIN — PIPERACILLIN SODIUM AND TAZOBACTAM SODIUM SCH MLS/HR: .375; 3 INJECTION, POWDER, LYOPHILIZED, FOR SOLUTION INTRAVENOUS at 11:51

## 2022-05-20 RX ADMIN — ALBUTEROL SULFATE SCH MG: 2.5 SOLUTION RESPIRATORY (INHALATION) at 23:12

## 2022-05-20 RX ADMIN — APIXABAN SCH MG: 2.5 TABLET, FILM COATED ORAL at 09:35

## 2022-05-20 RX ADMIN — METOPROLOL TARTRATE SCH MG: 50 TABLET, FILM COATED ORAL at 13:00

## 2022-05-20 RX ADMIN — FERROUS SULFATE TAB 325 MG (65 MG ELEMENTAL FE) SCH MG: 325 (65 FE) TAB at 09:32

## 2022-05-20 RX ADMIN — FERROUS SULFATE TAB 325 MG (65 MG ELEMENTAL FE) SCH MG: 325 (65 FE) TAB at 14:19

## 2022-05-20 RX ADMIN — PIPERACILLIN SODIUM AND TAZOBACTAM SODIUM SCH MLS/HR: .375; 3 INJECTION, POWDER, LYOPHILIZED, FOR SOLUTION INTRAVENOUS at 02:31

## 2022-05-20 RX ADMIN — THERA TABS SCH UDTAB: TAB at 09:33

## 2022-05-20 RX ADMIN — METOPROLOL TARTRATE SCH MG: 50 TABLET, FILM COATED ORAL at 09:00

## 2022-05-20 NOTE — NUR
RT



PT RECVD AWAKE ON SIMPLE MASK WITH COOL AEROSOL 40% 10 LPM. NTS DONE WITH SPO2 % HR 
80. NO SOB OR RESPIRATORY DISTRESS AT THIS TIME. WILL CONTINUE TO MONITOR.

## 2022-05-20 NOTE — NUR
TELE RN NOTES

RECEIVED  PATIENT IN BED OBTUNDED. ON COOL AEROSOL AT 10 LPM SATING 99%.ON TELE  MONITOR  SR 
78 NO SOB NO DISTRESS NOTED . V/S STABLE AFEBRILE . WITH IV ACCESS AT MIKE MIDLINE PATENT 
FLUSHES WELL. WITH GT FEEDING  JEVITY 1.2 AT 20CC/HR  FEEDING WELL TOLERATED  NO RESIDUAL  
NOTED .HOB ELEVATED  FOR ASPIRATION PRECAUTION  DUE MEDS  GIVEN AS ORDERED , WITH SMITH 
CATHETER CONNECTED TO URINE BAG DRAINING YELLOWISH URINE. ALL SAFETY MEASURES IN PLACE AT 
ALL TIMES. TURNED AND REPOSITION  . CALL LIGHT WITHIN REACH WILL CLOSELY MONITOR THE 
PATIENT.

## 2022-05-20 NOTE — NUR
RN NOTES



PATIENT RESTING IN BED. CONTINUES ON AEROSOL AT 10 LPM TOLERATED WELL SATING 96%.NO SOB NO 
DISTRESS NOTED. GT PATENT AND CONTINUES WITH FEEDING, TOLERATING WELL. ASPIRATION PREC 
FOLLOWED.  SMITH CATH PATENT DRAINING WELL. IV ACCESS PATENT. PATIENT SR THIS SHIFT. ALL 
SAFETY MEASURES IN PLACE. DUE MEDS GIVEN, AM/PM CARE DONE.

## 2022-05-20 NOTE — NUR
RN NOTES





PATIENT IN BED STILL ON AEROSOL AT 10 LPM TOLERATED WELL SATING 97%.NO SOB NO DISTRESS NOTED 
AT THIS TIMEGT PATENT CONNECTED TO CONTINUOS FEEDING TOLERATING WELL. WITH SMITH PATENT 
DRAINING YELLOWISH URINE. IV ACCESS PATENT. PATIENT HR SR 70'S. ALL SAFETY MEASURES IN PLACE 
AT ALL TIMES. HOB ELEVATED. CALL LIGHT WITHIN REACH. BED ON LOWEST POSITION AND LOCKED. 
FREQUENT VISUAL MONITORING RENDERED. WILL ENDORSED TO MORNING SHIFT FOR MAYE

## 2022-05-21 VITALS — DIASTOLIC BLOOD PRESSURE: 91 MMHG | SYSTOLIC BLOOD PRESSURE: 110 MMHG

## 2022-05-21 VITALS — SYSTOLIC BLOOD PRESSURE: 123 MMHG | DIASTOLIC BLOOD PRESSURE: 60 MMHG

## 2022-05-21 VITALS — SYSTOLIC BLOOD PRESSURE: 122 MMHG | DIASTOLIC BLOOD PRESSURE: 58 MMHG

## 2022-05-21 VITALS — DIASTOLIC BLOOD PRESSURE: 50 MMHG | SYSTOLIC BLOOD PRESSURE: 135 MMHG

## 2022-05-21 VITALS — SYSTOLIC BLOOD PRESSURE: 109 MMHG | DIASTOLIC BLOOD PRESSURE: 52 MMHG

## 2022-05-21 VITALS — SYSTOLIC BLOOD PRESSURE: 134 MMHG | DIASTOLIC BLOOD PRESSURE: 71 MMHG

## 2022-05-21 LAB
BASE EXCESS BLDA CALC-SCNC: 12 MMOL/L
BUN SERPL-MCNC: 15 MG/DL (ref 7–18)
CALCIUM SERPL-MCNC: 8.4 MG/DL (ref 8.5–10.1)
CHLORIDE SERPL-SCNC: 97 MMOL/L (ref 98–107)
CO2 SERPL-SCNC: 38 MMOL/L (ref 21–32)
CREAT SERPL-MCNC: 0.6 MG/DL (ref 0.6–1.3)
DO-HGB MFR BLDA: 92.8 MMHG
GLUCOSE SERPL-MCNC: 141 MG/DL (ref 74–106)
INHALED O2 CONCENTRATION: 28 %
INHALED O2 FLOW RATE: 5 L/MIN (ref 0–30)
PCO2 TEMP ADJ BLDA: 45.4 MMHG (ref 35–45)
PH TEMP ADJ BLDA: 7.52 [PH] (ref 7.35–7.45)
PO2 TEMP ADJ BLDA: 53.3 MMHG (ref 75–100)
POTASSIUM SERPL-SCNC: 3.2 MMOL/L (ref 3.5–5.1)
SAO2 % BLDA: 87.9 % (ref 92–98.5)
SODIUM SERPL-SCNC: 138 MMOL/L (ref 136–145)
VENTILATION MODE VENT: (no result)

## 2022-05-21 PROCEDURE — 05HC33Z INSERTION OF INFUSION DEVICE INTO LEFT BASILIC VEIN, PERCUTANEOUS APPROACH: ICD-10-PCS | Performed by: NURSE PRACTITIONER

## 2022-05-21 RX ADMIN — APIXABAN SCH MG: 2.5 TABLET, FILM COATED ORAL at 09:12

## 2022-05-21 RX ADMIN — FUROSEMIDE SCH MG: 10 INJECTION INTRAVENOUS at 16:35

## 2022-05-21 RX ADMIN — APIXABAN SCH MG: 2.5 TABLET, FILM COATED ORAL at 16:37

## 2022-05-21 RX ADMIN — METOPROLOL TARTRATE SCH MG: 50 TABLET, FILM COATED ORAL at 09:10

## 2022-05-21 RX ADMIN — VITAMIN D, TAB 1000IU (100/BT) SCH UNIT: 25 TAB at 09:09

## 2022-05-21 RX ADMIN — ACETYLCYSTEINE SCH MG: 100 INHALANT RESPIRATORY (INHALATION) at 15:27

## 2022-05-21 RX ADMIN — THERA TABS SCH UDTAB: TAB at 09:09

## 2022-05-21 RX ADMIN — POTASSIUM CHLORIDE SCH MLS/HR: 200 INJECTION, SOLUTION INTRAVENOUS at 10:23

## 2022-05-21 RX ADMIN — POTASSIUM CHLORIDE SCH MLS/HR: 200 INJECTION, SOLUTION INTRAVENOUS at 11:30

## 2022-05-21 RX ADMIN — ALBUTEROL SULFATE SCH MG: 2.5 SOLUTION RESPIRATORY (INHALATION) at 15:27

## 2022-05-21 RX ADMIN — METOPROLOL TARTRATE SCH MG: 50 TABLET, FILM COATED ORAL at 13:10

## 2022-05-21 RX ADMIN — ALBUTEROL SULFATE SCH MG: 2.5 SOLUTION RESPIRATORY (INHALATION) at 22:48

## 2022-05-21 RX ADMIN — DEXTROSE MONOHYDRATE PRN MLS/HR: 50 INJECTION, SOLUTION INTRAVENOUS at 20:06

## 2022-05-21 RX ADMIN — AMITRIPTYLINE HYDROCHLORIDE SCH MG: 25 TABLET, FILM COATED ORAL at 18:48

## 2022-05-21 RX ADMIN — POTASSIUM CHLORIDE SCH MLS/HR: 200 INJECTION, SOLUTION INTRAVENOUS at 11:39

## 2022-05-21 RX ADMIN — PIPERACILLIN SODIUM AND TAZOBACTAM SODIUM SCH MLS/HR: .375; 3 INJECTION, POWDER, LYOPHILIZED, FOR SOLUTION INTRAVENOUS at 18:48

## 2022-05-21 RX ADMIN — FERROUS SULFATE TAB 325 MG (65 MG ELEMENTAL FE) SCH MG: 325 (65 FE) TAB at 09:09

## 2022-05-21 RX ADMIN — LISINOPRIL SCH MG: 10 TABLET ORAL at 09:11

## 2022-05-21 RX ADMIN — FERROUS SULFATE TAB 325 MG (65 MG ELEMENTAL FE) SCH MG: 325 (65 FE) TAB at 13:10

## 2022-05-21 RX ADMIN — PANTOPRAZOLE SODIUM SCH MG: 40 GRANULE, DELAYED RELEASE ORAL at 09:10

## 2022-05-21 RX ADMIN — Medication PRN ML: at 11:24

## 2022-05-21 RX ADMIN — ALBUTEROL SULFATE SCH MG: 2.5 SOLUTION RESPIRATORY (INHALATION) at 08:01

## 2022-05-21 RX ADMIN — ASPIRIN 81 MG SCH MG: 81 TABLET ORAL at 09:11

## 2022-05-21 RX ADMIN — POTASSIUM CHLORIDE SCH MLS/HR: 200 INJECTION, SOLUTION INTRAVENOUS at 12:45

## 2022-05-21 RX ADMIN — FERROUS SULFATE TAB 325 MG (65 MG ELEMENTAL FE) SCH MG: 325 (65 FE) TAB at 16:35

## 2022-05-21 RX ADMIN — PIPERACILLIN SODIUM AND TAZOBACTAM SODIUM SCH MLS/HR: .375; 3 INJECTION, POWDER, LYOPHILIZED, FOR SOLUTION INTRAVENOUS at 02:17

## 2022-05-21 RX ADMIN — ACETYLCYSTEINE SCH MG: 100 INHALANT RESPIRATORY (INHALATION) at 22:48

## 2022-05-21 RX ADMIN — METOPROLOL TARTRATE SCH MG: 50 TABLET, FILM COATED ORAL at 16:34

## 2022-05-21 RX ADMIN — PIPERACILLIN SODIUM AND TAZOBACTAM SODIUM SCH MLS/HR: .375; 3 INJECTION, POWDER, LYOPHILIZED, FOR SOLUTION INTRAVENOUS at 11:56

## 2022-05-21 RX ADMIN — DONEPEZIL HYDROCHLORIDE SCH MG: 5 TABLET ORAL at 21:00

## 2022-05-21 RX ADMIN — FUROSEMIDE SCH MG: 10 INJECTION INTRAVENOUS at 09:09

## 2022-05-21 RX ADMIN — ACETYLCYSTEINE SCH MG: 100 INHALANT RESPIRATORY (INHALATION) at 08:00

## 2022-05-21 RX ADMIN — DEXTROSE MONOHYDRATE PRN MLS/HR: 50 INJECTION, SOLUTION INTRAVENOUS at 09:26

## 2022-05-21 NOTE — NUR
RN NOTE

CORDARONE LOADING DOSE STARTED AS ORDERED. SVT ON TELEMETRY MONITOR WITH HR  BPM. BP 
110/91.

## 2022-05-21 NOTE — NUR
RN OPENING NOTE

PATIENT IS IN BED AWAKE, OBTUNDED. WITH OXYGEN VIA SIMPLE FACE MASK AT 10 L/MIN, OXYGEN 
SATURATION AT 96%. WITH DIMINISHED BREATH SOUNDS ON LEFT LUNG. NOT IN ACUTE RESPIRATORY 
DISTRESS. SINUS RHYTHM ON TELEMETRY MONITOR. WITH PEG TUBE INFUSING WITH JEVITY AT 20 ML/HR. 
WITH RIGHT UPPER ARM MIDLINE INFUSING INTACT AND PATENT. BED IS LOCKED IN THE LOWEST 
POSITION, 3 GUARD RAILS RAISED, CALL BELL WITHIN REACH, AND ALL HOSPITAL SAFETY PRECAUTIONS 
ARE IN PLACE. WILL CONTINUE TO MONITOR THROUGHOUT SHIFT.

## 2022-05-21 NOTE — NUR
FIO2 INCREASED FROM 28% TO 40% FIO2 DUE TO 53 PAO2.



RN AWARE ON CHANGES MADE.

-------------------------------------------------------------------------------

Addendum: 05/21/22 at 1010 by NILESH PRATT RT

-------------------------------------------------------------------------------

Amended: Links added.

## 2022-05-21 NOTE — NUR
TELE RN NOTES

RECEIVED  PATIENT IN BED OBTUNDED. ON COOL AEROSOL AT 10 LPM SATING 95%.ON TELE  MONITOR  SR 
61 NO SOB NO DISTRESS NOTED . V/S STABLE AFEBRILE . WITH IV ACCESS AT MIKE MIDLINE PATENT  
AMIODARONE DRIP AT  0.5MG/MININFUSING WELL , EVONNE ML  INTACT AND  PATENT  . WITH GT FEEDING  
JEVITY 1.2 AT 20CC/HR  FEEDING WELL TOLERATED  NO RESIDUAL  NOTED .HOB ELEVATED  FOR 
ASPIRATION PRECAUTION  DUE MEDS  GIVEN AS ORDERED , WITH SMITH CATHETER CONNECTED TO URINE 
BAG DRAINING YELLOWISH URINE. ALL SAFETY MEASURES IN PLACE AT ALL TIMES. TURNED AND 
REPOSITION  . CALL LIGHT WITHIN REACH WILL CLOSELY MONITOR THE PATIENT.

## 2022-05-21 NOTE — NUR
tele rn notes 

pts  remains in bed  on 10 liters via  simple  mask cool aerosol.sating  96%  v/s stable  
afebrile ,  all needs  meet no lindsey  noted  the whole shift will  endorse to rn day  shift  
escobar  for  continuity of care.

## 2022-05-21 NOTE — NUR
RN NOTE

CORDARONE DRIP AT 1 MG/ML X 6 HOURS STARTED.  BPM ON MONITOR. PATIENT ON CLOSE 
MONITORING.

## 2022-05-21 NOTE — NUR
RN NOTE

PATIENT SEEN AND EXAMINED BY DR. GRAJEDA. WITH ORDERS MADE AND CARRIED OUT. PATIENT MONITORED 
CLOSELY.

## 2022-05-21 NOTE — NUR
RN CLOSING NOTE

PATIENT IS IN BED AWAKE, OBTUNDED. WITH OXYGEN VIA SIMPLE FACE MASK AT 10 L/MIN, OXYGEN 
SATURATION AT 95%. WITH DIMINISHED BREATH SOUNDS ON LEFT LUNG. NOT IN DISTRESS. SINUS RHYTHM 
ON TELEMETRY MONITOR. WITH PEG TUBE INFUSING WITH JEVITY AT 20 ML/HR. WITH RIGHT UPPER ARM 
MIDLINE INFUSING WITH CORDARONE DRIP AT 1 MG/MIN. WITH LEFT UPPER ARM MIDLINE, INTACT AND 
PATENT. ALL NEEDS ATTENDED. KEPT CLEAN AND COMFORTABLE. BED IS LOCKED IN THE LOWEST 
POSITION, 3 GUARD RAILS RAISED, CALL BELL WITHIN REACH, AND ALL HOSPITAL SAFETY PRECAUTIONS 
ARE IN PLACE. WILL ENDORSE TO NIGHT SHIFT NURSE.

## 2022-05-22 VITALS — SYSTOLIC BLOOD PRESSURE: 147 MMHG | DIASTOLIC BLOOD PRESSURE: 61 MMHG

## 2022-05-22 VITALS — DIASTOLIC BLOOD PRESSURE: 62 MMHG | SYSTOLIC BLOOD PRESSURE: 124 MMHG

## 2022-05-22 VITALS — DIASTOLIC BLOOD PRESSURE: 65 MMHG | SYSTOLIC BLOOD PRESSURE: 130 MMHG

## 2022-05-22 VITALS — DIASTOLIC BLOOD PRESSURE: 59 MMHG | SYSTOLIC BLOOD PRESSURE: 121 MMHG

## 2022-05-22 VITALS — SYSTOLIC BLOOD PRESSURE: 115 MMHG | DIASTOLIC BLOOD PRESSURE: 59 MMHG

## 2022-05-22 VITALS — DIASTOLIC BLOOD PRESSURE: 57 MMHG | SYSTOLIC BLOOD PRESSURE: 114 MMHG

## 2022-05-22 LAB
ALBUMIN SERPL BCP-MCNC: 1.8 G/DL (ref 3.4–5)
ALP SERPL-CCNC: 75 U/L (ref 46–116)
ALT SERPL W P-5'-P-CCNC: 25 U/L (ref 12–78)
AST SERPL W P-5'-P-CCNC: 22 U/L (ref 15–37)
BASOPHILS # BLD AUTO: 0 K/UL (ref 0–0.2)
BASOPHILS NFR BLD AUTO: 0.2 % (ref 0–2)
BILIRUB SERPL-MCNC: 0.8 MG/DL (ref 0.2–1)
BUN SERPL-MCNC: 14 MG/DL (ref 7–18)
CALCIUM SERPL-MCNC: 8.4 MG/DL (ref 8.5–10.1)
CHLORIDE SERPL-SCNC: 96 MMOL/L (ref 98–107)
CO2 SERPL-SCNC: 41 MMOL/L (ref 21–32)
CREAT SERPL-MCNC: 0.6 MG/DL (ref 0.6–1.3)
EOSINOPHIL NFR BLD AUTO: 2.3 % (ref 0–6)
GLUCOSE SERPL-MCNC: 138 MG/DL (ref 74–106)
HCT VFR BLD AUTO: 34 % (ref 33–45)
HGB BLD-MCNC: 11 G/DL (ref 11.5–14.8)
LYMPHOCYTES NFR BLD AUTO: 0.6 K/UL (ref 0.8–4.8)
LYMPHOCYTES NFR BLD AUTO: 6.2 % (ref 20–44)
MAGNESIUM SERPL-MCNC: 2 MG/DL (ref 1.8–2.4)
MCHC RBC AUTO-ENTMCNC: 33 G/DL (ref 31–36)
MCV RBC AUTO: 81 FL (ref 82–100)
MONOCYTES NFR BLD AUTO: 0.9 K/UL (ref 0.1–1.3)
MONOCYTES NFR BLD AUTO: 10.2 % (ref 2–12)
NEUTROPHILS # BLD AUTO: 7.6 K/UL (ref 1.8–8.9)
NEUTROPHILS NFR BLD AUTO: 81.1 % (ref 43–81)
PHOSPHATE SERPL-MCNC: 3.7 MG/DL (ref 2.5–4.9)
PLATELET # BLD AUTO: 171 K/UL (ref 150–450)
POTASSIUM SERPL-SCNC: 3.3 MMOL/L (ref 3.5–5.1)
PROT SERPL-MCNC: 5.6 G/DL (ref 6.4–8.2)
RBC # BLD AUTO: 4.17 MIL/UL (ref 4–5.2)
SODIUM SERPL-SCNC: 137 MMOL/L (ref 136–145)
WBC NRBC COR # BLD AUTO: 9.3 K/UL (ref 4.3–11)

## 2022-05-22 RX ADMIN — ACETYLCYSTEINE SCH MG: 100 INHALANT RESPIRATORY (INHALATION) at 15:17

## 2022-05-22 RX ADMIN — THERA TABS SCH UDTAB: TAB at 08:21

## 2022-05-22 RX ADMIN — ACETYLCYSTEINE SCH MG: 100 INHALANT RESPIRATORY (INHALATION) at 23:51

## 2022-05-22 RX ADMIN — ASPIRIN 81 MG SCH MG: 81 TABLET ORAL at 08:21

## 2022-05-22 RX ADMIN — METOPROLOL TARTRATE SCH MG: 50 TABLET, FILM COATED ORAL at 08:23

## 2022-05-22 RX ADMIN — METOPROLOL TARTRATE SCH MG: 50 TABLET, FILM COATED ORAL at 17:11

## 2022-05-22 RX ADMIN — FERROUS SULFATE TAB 325 MG (65 MG ELEMENTAL FE) SCH MG: 325 (65 FE) TAB at 17:11

## 2022-05-22 RX ADMIN — VITAMIN D, TAB 1000IU (100/BT) SCH UNIT: 25 TAB at 08:22

## 2022-05-22 RX ADMIN — METOPROLOL TARTRATE SCH MG: 50 TABLET, FILM COATED ORAL at 13:02

## 2022-05-22 RX ADMIN — ALBUTEROL SULFATE SCH MG: 2.5 SOLUTION RESPIRATORY (INHALATION) at 07:38

## 2022-05-22 RX ADMIN — APIXABAN SCH MG: 2.5 TABLET, FILM COATED ORAL at 08:23

## 2022-05-22 RX ADMIN — FERROUS SULFATE TAB 325 MG (65 MG ELEMENTAL FE) SCH MG: 325 (65 FE) TAB at 08:22

## 2022-05-22 RX ADMIN — AMIODARONE HYDROCHLORIDE SCH MG: 200 TABLET ORAL at 17:12

## 2022-05-22 RX ADMIN — ACETYLCYSTEINE SCH MG: 100 INHALANT RESPIRATORY (INHALATION) at 07:38

## 2022-05-22 RX ADMIN — AMITRIPTYLINE HYDROCHLORIDE SCH MG: 25 TABLET, FILM COATED ORAL at 17:11

## 2022-05-22 RX ADMIN — ALBUTEROL SULFATE SCH MG: 2.5 SOLUTION RESPIRATORY (INHALATION) at 23:51

## 2022-05-22 RX ADMIN — AMIODARONE HYDROCHLORIDE SCH MG: 200 TABLET ORAL at 08:21

## 2022-05-22 RX ADMIN — PIPERACILLIN SODIUM AND TAZOBACTAM SODIUM SCH MLS/HR: .375; 3 INJECTION, POWDER, LYOPHILIZED, FOR SOLUTION INTRAVENOUS at 09:06

## 2022-05-22 RX ADMIN — APIXABAN SCH MG: 2.5 TABLET, FILM COATED ORAL at 17:14

## 2022-05-22 RX ADMIN — ALBUTEROL SULFATE SCH MG: 2.5 SOLUTION RESPIRATORY (INHALATION) at 15:17

## 2022-05-22 RX ADMIN — PIPERACILLIN SODIUM AND TAZOBACTAM SODIUM SCH MLS/HR: .375; 3 INJECTION, POWDER, LYOPHILIZED, FOR SOLUTION INTRAVENOUS at 02:23

## 2022-05-22 RX ADMIN — PANTOPRAZOLE SODIUM SCH MG: 40 GRANULE, DELAYED RELEASE ORAL at 08:22

## 2022-05-22 RX ADMIN — FERROUS SULFATE TAB 325 MG (65 MG ELEMENTAL FE) SCH MG: 325 (65 FE) TAB at 13:02

## 2022-05-22 RX ADMIN — LISINOPRIL SCH MG: 10 TABLET ORAL at 08:23

## 2022-05-22 RX ADMIN — PIPERACILLIN SODIUM AND TAZOBACTAM SODIUM SCH MLS/HR: .375; 3 INJECTION, POWDER, LYOPHILIZED, FOR SOLUTION INTRAVENOUS at 17:12

## 2022-05-22 RX ADMIN — DONEPEZIL HYDROCHLORIDE SCH MG: 5 TABLET ORAL at 21:18

## 2022-05-22 NOTE — NUR
TELE RN NOTE 

PATIENT IN BED , AWAKE BUT LETHARGIC WITH 10L OF O2 VIA COOLER [id number]% SATURATION 94% AT 
THIS TIME,  ON TELE MONITOR SR HR 74 AT THIS TIME,  WITH GTUBE FEEDING AS ORDERED ,KEEP HOB 
ELEVATED AT ALL TIME, MOUTH CARE DONE FREQUENT ORAL AND  NASOPHARYNGEAL SUCTION D,ONE  WITH 
SMITH CATH TO GRAVITY  WITH YELLOW COLOR URINE, BED IN LOWEST AND LOCKED POSITION , CALL 
LIGHT WITHIN REACH, WILL CONT TO MONITOR CLOSELY

## 2022-05-22 NOTE — NUR
TATE RN NOTES  

SEEN AND EXAMINED  BY DR DR GRAJEDA  WITH ORDER D/C AMIODARONE  DRIP CHANGE TO  PO  ENDORSE TO 
ROWDY ORTA DAY SHIFT .

## 2022-05-22 NOTE — NUR
TATE RN NOTE 

 PATIENT IN BED, BOTH EYES OPEN, ON COOLER AEROSOL 40% 10L SATURATION 93% AT THIS TIME, ON 
TELE MONITOR SR HR 74 , ON G TUBE FEEDING AS ORDERED KEEP HOB ELEVATED AT ALL TIME, EVONNE AND 
RT UPPER ARM MID LINE IN PLACE , BED IN LOWEST AND LOCKED POSITION , STILL WITH CHEST 
CONGESTION NOTED, WITH GURGLING SOUND,  WILL SUCTION PRN , BED IN LOWEST AND LOCKED POSITION 
, CALL LIGHT WITHIN REACH WITH SMITH CATH TO GRAVITY, WILL MONITOR

## 2022-05-22 NOTE — NUR
tele rn notes 

pts  remains in bed  on 10 liters via  simple  mask cool aerosol.sating  96%  v/s stable  
afebrile ,  all needs  meet continue on amio drip till  completed  .will endorse to rn day 
shift  for  continuity of care  .

## 2022-05-22 NOTE — NUR
Pt transferred to hospital bed and updated on POC who verbalized understanding. Pt now sleeping in bed. Respirations even and unlabored.    TATE RN NOTE

 CO2 41 DR SERRA NOTIFIED NO NEW ORDER GIVEN AT THIS TIME, WILL MONITOR

## 2022-05-22 NOTE — NUR
lizet rn  note 

 rt at bedside oral suction done ,saturation 96% at this time , keep clean dry turn 
reposition, will cont to monitor

## 2022-05-22 NOTE — NUR
RT



PT RECVD AWAKE AND NONVERBAL ON 40% FIO2, 10 LPM COOL AEROSOL VIA MASK. TX GIVEN AND CELIA 
WELL. NT SUCTION DONE PRN WITH MODERATE PINK TINGE, THICK TAN SECRETIONS NOTED. WILL 
CONTINUE TO MONITOR THROUGHOUT SHIFT.

## 2022-05-23 VITALS — SYSTOLIC BLOOD PRESSURE: 151 MMHG | DIASTOLIC BLOOD PRESSURE: 58 MMHG

## 2022-05-23 VITALS — SYSTOLIC BLOOD PRESSURE: 162 MMHG | DIASTOLIC BLOOD PRESSURE: 55 MMHG

## 2022-05-23 VITALS — SYSTOLIC BLOOD PRESSURE: 150 MMHG | DIASTOLIC BLOOD PRESSURE: 58 MMHG

## 2022-05-23 VITALS — DIASTOLIC BLOOD PRESSURE: 63 MMHG | SYSTOLIC BLOOD PRESSURE: 130 MMHG

## 2022-05-23 VITALS — DIASTOLIC BLOOD PRESSURE: 60 MMHG | SYSTOLIC BLOOD PRESSURE: 150 MMHG

## 2022-05-23 VITALS — DIASTOLIC BLOOD PRESSURE: 60 MMHG | SYSTOLIC BLOOD PRESSURE: 142 MMHG

## 2022-05-23 LAB
BUN SERPL-MCNC: 14 MG/DL (ref 7–18)
CALCIUM SERPL-MCNC: 8.4 MG/DL (ref 8.5–10.1)
CHLORIDE SERPL-SCNC: 98 MMOL/L (ref 98–107)
CO2 SERPL-SCNC: 39 MMOL/L (ref 21–32)
CREAT SERPL-MCNC: 0.6 MG/DL (ref 0.6–1.3)
GLUCOSE SERPL-MCNC: 131 MG/DL (ref 74–106)
POTASSIUM SERPL-SCNC: 3.8 MMOL/L (ref 3.5–5.1)
SODIUM SERPL-SCNC: 139 MMOL/L (ref 136–145)

## 2022-05-23 RX ADMIN — PANTOPRAZOLE SODIUM SCH MG: 40 GRANULE, DELAYED RELEASE ORAL at 09:54

## 2022-05-23 RX ADMIN — ALBUTEROL SULFATE SCH MG: 2.5 SOLUTION RESPIRATORY (INHALATION) at 08:14

## 2022-05-23 RX ADMIN — PIPERACILLIN SODIUM AND TAZOBACTAM SODIUM SCH MLS/HR: .375; 3 INJECTION, POWDER, LYOPHILIZED, FOR SOLUTION INTRAVENOUS at 17:31

## 2022-05-23 RX ADMIN — ASPIRIN 81 MG SCH MG: 81 TABLET ORAL at 09:50

## 2022-05-23 RX ADMIN — PIPERACILLIN SODIUM AND TAZOBACTAM SODIUM SCH MLS/HR: .375; 3 INJECTION, POWDER, LYOPHILIZED, FOR SOLUTION INTRAVENOUS at 09:54

## 2022-05-23 RX ADMIN — VITAMIN D, TAB 1000IU (100/BT) SCH UNIT: 25 TAB at 09:53

## 2022-05-23 RX ADMIN — PIPERACILLIN SODIUM AND TAZOBACTAM SODIUM SCH MLS/HR: .375; 3 INJECTION, POWDER, LYOPHILIZED, FOR SOLUTION INTRAVENOUS at 01:06

## 2022-05-23 RX ADMIN — METOPROLOL TARTRATE SCH MG: 50 TABLET, FILM COATED ORAL at 12:54

## 2022-05-23 RX ADMIN — ACETYLCYSTEINE SCH MG: 100 INHALANT RESPIRATORY (INHALATION) at 08:14

## 2022-05-23 RX ADMIN — LISINOPRIL SCH MG: 10 TABLET ORAL at 09:51

## 2022-05-23 RX ADMIN — METOPROLOL TARTRATE SCH MG: 50 TABLET, FILM COATED ORAL at 09:53

## 2022-05-23 RX ADMIN — ACETYLCYSTEINE SCH MG: 100 INHALANT RESPIRATORY (INHALATION) at 14:41

## 2022-05-23 RX ADMIN — METOPROLOL TARTRATE SCH MG: 50 TABLET, FILM COATED ORAL at 17:33

## 2022-05-23 RX ADMIN — ALBUTEROL SULFATE SCH MG: 2.5 SOLUTION RESPIRATORY (INHALATION) at 14:41

## 2022-05-23 RX ADMIN — THERA TABS SCH UDTAB: TAB at 09:53

## 2022-05-23 RX ADMIN — AMIODARONE HYDROCHLORIDE SCH MG: 200 TABLET ORAL at 17:33

## 2022-05-23 RX ADMIN — AMIODARONE HYDROCHLORIDE SCH MG: 200 TABLET ORAL at 09:54

## 2022-05-23 RX ADMIN — APIXABAN SCH MG: 2.5 TABLET, FILM COATED ORAL at 17:32

## 2022-05-23 RX ADMIN — ALBUTEROL SULFATE SCH MG: 2.5 SOLUTION RESPIRATORY (INHALATION) at 23:05

## 2022-05-23 RX ADMIN — FERROUS SULFATE TAB 325 MG (65 MG ELEMENTAL FE) SCH MG: 325 (65 FE) TAB at 12:54

## 2022-05-23 RX ADMIN — ACETYLCYSTEINE SCH MG: 100 INHALANT RESPIRATORY (INHALATION) at 23:05

## 2022-05-23 RX ADMIN — DONEPEZIL HYDROCHLORIDE SCH MG: 5 TABLET ORAL at 21:22

## 2022-05-23 RX ADMIN — APIXABAN SCH MG: 2.5 TABLET, FILM COATED ORAL at 09:52

## 2022-05-23 RX ADMIN — FERROUS SULFATE TAB 325 MG (65 MG ELEMENTAL FE) SCH MG: 325 (65 FE) TAB at 09:53

## 2022-05-23 RX ADMIN — AMITRIPTYLINE HYDROCHLORIDE SCH MG: 25 TABLET, FILM COATED ORAL at 17:32

## 2022-05-23 RX ADMIN — FERROUS SULFATE TAB 325 MG (65 MG ELEMENTAL FE) SCH MG: 325 (65 FE) TAB at 17:32

## 2022-05-23 RX ADMIN — DEXTROSE AND SODIUM CHLORIDE PRN MLS/HR: 5; 900 INJECTION, SOLUTION INTRAVENOUS at 10:11

## 2022-05-23 NOTE — NUR
TELE RN OPENING NOTE 



PATIENT IN BED , AWAKE BUT LETHARGIC WITH 10L OF O2 VIA COOLER [id number]%,  ON TELE MONITOR, 
NO CURRENT SIGNS OF SOB OR DISTRESS.  WITH GTUBE FEEDING AS ORDERED ,NOTED TO HAVE SMITH 
CATH TO GRAVITY  WITH YELLOW COLOR URINE, BED IN LOWEST AND LOCKED POSITION , CALL LIGHT 
WITHIN REACH.

## 2022-05-23 NOTE — NUR
TATE RN NOTE,

 

PATIENT IN BED, COOL AEROSOL  10L, 40% FIO2,  NO SOB/ACUTE  DISTRESS, NSR WITH HR 60S MOSTLY 
DURING THE NIGHT, FREQUENT SUCTIONING FOR RETENTION OF SECRETIONS, O2 >92 MAINTAINED, MIKE 
AND EVONNE MIDLINES IN PLACED BOTH PATENT AND INTACT, SMITH CATH IN PLACE, DRAINING TO YELLOW  
URINE, OTHERWISE NO SIGNIFICANT CHANGE IN CONDITION,  REPOSITION Q2H PROVIDED, BED LOCKED 
AND IN LOWEST POSITION, S/R OF BED UP X3, WILL ENDORSE CONTINUITY OF CARE TO ONCOMING NURSE.

## 2022-05-23 NOTE — NUR
RN OPENING NOTE,

 

PATIENT IN BED, NO SOB/ACUTE DISTRESS NOTED, CONT ON COOL AEROSOL  10L, 40% FIO2,  NSR IN 
TELE MONITOR WITH HR 60S AT THIS TIME,   MIKE AND EVONNE MIDLINES IN PLACE, RUNNING D5NS AT 
75MLS/HR, SMITH CATH IN PLACE, DRAINING TO YELLOW  URINE, BED LOCKED AND IN LOWEST POSITION, 
S/R OF BED UP X3, WILL CONTINUE T MONITOR CLOSELY.

## 2022-05-24 VITALS — SYSTOLIC BLOOD PRESSURE: 139 MMHG | DIASTOLIC BLOOD PRESSURE: 64 MMHG

## 2022-05-24 VITALS — DIASTOLIC BLOOD PRESSURE: 77 MMHG | SYSTOLIC BLOOD PRESSURE: 165 MMHG

## 2022-05-24 VITALS — DIASTOLIC BLOOD PRESSURE: 69 MMHG | SYSTOLIC BLOOD PRESSURE: 140 MMHG

## 2022-05-24 VITALS — SYSTOLIC BLOOD PRESSURE: 151 MMHG | DIASTOLIC BLOOD PRESSURE: 75 MMHG

## 2022-05-24 VITALS — DIASTOLIC BLOOD PRESSURE: 72 MMHG | SYSTOLIC BLOOD PRESSURE: 143 MMHG

## 2022-05-24 VITALS — DIASTOLIC BLOOD PRESSURE: 67 MMHG | SYSTOLIC BLOOD PRESSURE: 156 MMHG

## 2022-05-24 LAB
BASOPHILS # BLD AUTO: 0 K/UL (ref 0–0.2)
BASOPHILS NFR BLD AUTO: 0.6 % (ref 0–2)
BUN SERPL-MCNC: 11 MG/DL (ref 7–18)
CALCIUM SERPL-MCNC: 8.4 MG/DL (ref 8.5–10.1)
CHLORIDE SERPL-SCNC: 100 MMOL/L (ref 98–107)
CO2 SERPL-SCNC: 34 MMOL/L (ref 21–32)
CREAT SERPL-MCNC: 0.7 MG/DL (ref 0.6–1.3)
EOSINOPHIL NFR BLD AUTO: 2.2 % (ref 0–6)
GLUCOSE SERPL-MCNC: 173 MG/DL (ref 74–106)
HCT VFR BLD AUTO: 35 % (ref 33–45)
HGB BLD-MCNC: 11.2 G/DL (ref 11.5–14.8)
LYMPHOCYTES NFR BLD AUTO: 0.4 K/UL (ref 0.8–4.8)
LYMPHOCYTES NFR BLD AUTO: 6.9 % (ref 20–44)
MAGNESIUM SERPL-MCNC: 2.4 MG/DL (ref 1.8–2.4)
MCHC RBC AUTO-ENTMCNC: 32 G/DL (ref 31–36)
MCV RBC AUTO: 80 FL (ref 82–100)
MONOCYTES NFR BLD AUTO: 0.5 K/UL (ref 0.1–1.3)
MONOCYTES NFR BLD AUTO: 9.2 % (ref 2–12)
NEUTROPHILS # BLD AUTO: 4.4 K/UL (ref 1.8–8.9)
NEUTROPHILS NFR BLD AUTO: 81.1 % (ref 43–81)
PHOSPHATE SERPL-MCNC: 3.5 MG/DL (ref 2.5–4.9)
PLATELET # BLD AUTO: 176 K/UL (ref 150–450)
POTASSIUM SERPL-SCNC: 4 MMOL/L (ref 3.5–5.1)
RBC # BLD AUTO: 4.37 MIL/UL (ref 4–5.2)
SODIUM SERPL-SCNC: 138 MMOL/L (ref 136–145)
WBC NRBC COR # BLD AUTO: 5.4 K/UL (ref 4.3–11)

## 2022-05-24 RX ADMIN — ALBUTEROL SULFATE SCH MG: 2.5 SOLUTION RESPIRATORY (INHALATION) at 23:34

## 2022-05-24 RX ADMIN — VITAMIN D, TAB 1000IU (100/BT) SCH UNIT: 25 TAB at 08:03

## 2022-05-24 RX ADMIN — ASPIRIN 81 MG SCH MG: 81 TABLET ORAL at 08:03

## 2022-05-24 RX ADMIN — LISINOPRIL SCH MG: 10 TABLET ORAL at 08:08

## 2022-05-24 RX ADMIN — ACETYLCYSTEINE SCH MG: 100 INHALANT RESPIRATORY (INHALATION) at 23:34

## 2022-05-24 RX ADMIN — AMITRIPTYLINE HYDROCHLORIDE SCH MG: 25 TABLET, FILM COATED ORAL at 17:07

## 2022-05-24 RX ADMIN — HYDRALAZINE HYDROCHLORIDE PRN MG: 20 INJECTION INTRAMUSCULAR; INTRAVENOUS at 23:59

## 2022-05-24 RX ADMIN — DEXTROSE AND SODIUM CHLORIDE PRN MLS/HR: 5; 900 INJECTION, SOLUTION INTRAVENOUS at 16:31

## 2022-05-24 RX ADMIN — AMIODARONE HYDROCHLORIDE SCH MG: 200 TABLET ORAL at 16:11

## 2022-05-24 RX ADMIN — ALBUTEROL SULFATE SCH MG: 2.5 SOLUTION RESPIRATORY (INHALATION) at 14:36

## 2022-05-24 RX ADMIN — DEXTROSE AND SODIUM CHLORIDE PRN MLS/HR: 5; 900 INJECTION, SOLUTION INTRAVENOUS at 01:41

## 2022-05-24 RX ADMIN — APIXABAN SCH MG: 2.5 TABLET, FILM COATED ORAL at 08:06

## 2022-05-24 RX ADMIN — THERA TABS SCH UDTAB: TAB at 08:03

## 2022-05-24 RX ADMIN — AMIODARONE HYDROCHLORIDE SCH MG: 200 TABLET ORAL at 08:04

## 2022-05-24 RX ADMIN — METOPROLOL TARTRATE SCH MG: 50 TABLET, FILM COATED ORAL at 08:04

## 2022-05-24 RX ADMIN — FERROUS SULFATE TAB 325 MG (65 MG ELEMENTAL FE) SCH MG: 325 (65 FE) TAB at 11:58

## 2022-05-24 RX ADMIN — ACETYLCYSTEINE SCH MG: 100 INHALANT RESPIRATORY (INHALATION) at 14:36

## 2022-05-24 RX ADMIN — PIPERACILLIN SODIUM AND TAZOBACTAM SODIUM SCH MLS/HR: .375; 3 INJECTION, POWDER, LYOPHILIZED, FOR SOLUTION INTRAVENOUS at 09:25

## 2022-05-24 RX ADMIN — FERROUS SULFATE TAB 325 MG (65 MG ELEMENTAL FE) SCH MG: 325 (65 FE) TAB at 08:02

## 2022-05-24 RX ADMIN — PANTOPRAZOLE SODIUM SCH MG: 40 GRANULE, DELAYED RELEASE ORAL at 08:03

## 2022-05-24 RX ADMIN — METOPROLOL TARTRATE SCH MG: 50 TABLET, FILM COATED ORAL at 11:56

## 2022-05-24 RX ADMIN — DONEPEZIL HYDROCHLORIDE SCH MG: 5 TABLET ORAL at 21:47

## 2022-05-24 RX ADMIN — PIPERACILLIN SODIUM AND TAZOBACTAM SODIUM SCH MLS/HR: .375; 3 INJECTION, POWDER, LYOPHILIZED, FOR SOLUTION INTRAVENOUS at 02:28

## 2022-05-24 RX ADMIN — Medication PRN ML: at 11:39

## 2022-05-24 RX ADMIN — PIPERACILLIN SODIUM AND TAZOBACTAM SODIUM SCH MLS/HR: .375; 3 INJECTION, POWDER, LYOPHILIZED, FOR SOLUTION INTRAVENOUS at 17:05

## 2022-05-24 RX ADMIN — APIXABAN SCH MG: 2.5 TABLET, FILM COATED ORAL at 16:12

## 2022-05-24 RX ADMIN — ACETYLCYSTEINE SCH MG: 100 INHALANT RESPIRATORY (INHALATION) at 07:51

## 2022-05-24 RX ADMIN — ALBUTEROL SULFATE SCH MG: 2.5 SOLUTION RESPIRATORY (INHALATION) at 07:51

## 2022-05-24 RX ADMIN — METOPROLOL TARTRATE SCH MG: 50 TABLET, FILM COATED ORAL at 16:13

## 2022-05-24 RX ADMIN — FERROUS SULFATE TAB 325 MG (65 MG ELEMENTAL FE) SCH MG: 325 (65 FE) TAB at 16:14

## 2022-05-24 NOTE — NUR
TELE RNNOTE 

 PATIENT IN BED ,AWAKE MORE ALERT AT THIS TIME, MOUTH CARE AND SUCTION DONE ,WITH G TUBE 
FEEDING  AS ORDERED AT 30 ML PER HOUR, KEEP HOB ELEVATED AT ALL TIME, RT UPPER  ARM AND LT 
UPPER ARM MID LINE IN PLACE AND FLUSHED WELL , SHANE VF AS ORDERED, BED IN LOWEST AND LOCKED 
POSITION , CALL LIGHT WITHIN REACH ,TURN REPOSITION,

## 2022-05-24 NOTE — NUR
TATE RN  NOTE 

 PATIENT IN BED NONVERBAL OPEN BOTH EYES SLOW RESPONSE  TO VERBAL  COMMAND, ON 10 L COLLER 
AEROSOL 40% SATURATION 98% AT THIS TIME, ON TELE MONITOR SR  HR 66 RT AND LT UPPER ARM WITH 
MID LINE ON IVF AS ORDERED,   WITH SMITH COLOR TO GRAVITY WITH YELLOW LY  COLOR URINE,BED 
IN LOWEST AND LOCKED POSITION , CALL LIGHT WITHIHN REACH

## 2022-05-24 NOTE — NUR
TELE RN NOTES

Rt at bedside, oral and nasal suction done. Dr clark notified that during suction there is 
bleeding and patient is on elequis.

## 2022-05-24 NOTE — NUR
TELE RN OPENING NOTE,

 

PATIENT IN BED, A/O X 0, NON VERBAL, NO SOB/ACUTE DISTRESS NOTED AT THIS TIME, CONT ON COOL 
AEROSOL  10L, 40% FIO2, ON TELE MONITORING CURRENTLY READING SR AT 64 BPM AT THIS TIME, WITH 
MIKE AND EVONNE MIDLINES IN PLACE, RUNNING D5NS AT 75MLS/HR, SMITH CATH IN PLACE, DRAINING TO 
YELLOW COLORED URINE, CALL LIGHT WITHIN REACH, LEESA IN LOCKED AND LOWEST POSITION, BED ALARM 
ON. WILL CONTINUE TO MONITOR THROUGHOUT SHIFT.

## 2022-05-24 NOTE — NUR
TELE RN NOTES

dietitian, increase gtube feeding to 25ml/hr. keep HOB elevated. will continue to monitor 
closely.

## 2022-05-24 NOTE — NUR
TELE KRYSTYNA NOTE 

 ROUNDS MADE, CONT ON G TUBE FEEDING, NOTED STILL WITH CHEST CONGESTION NOTED, KEEP HOB 
ELEVATED AT ALL TIME, ON IVF AS ORDERED ,WILL CONT TO MONITOR CLOSELY

## 2022-05-24 NOTE — NUR
RN NOTE



PT NOTED WITH HIGH BP /73 MMHG, HYDRALAZINE 10 MG IV GIVEN AS ORDERED. RT ON BEDSIDE 
FOR BREATHING TX. WILL CONT TO MONITOR.

## 2022-05-24 NOTE — NUR
RN CLOSING NOTE,

PATIENT IN BED, WITH EYES OPEN, SEEMS MORE ALERT,  COOL AEROSOL  10L, 40% FIO2, WITH OPTIMAL 
O2 SAT LEVEL,  NO SOB/ACUTE  DISTRESS, NSR WITH HR 60S MOSTLY DURING THE NIGHT, FREQUENT 
SUCTIONING FOR RETENTION OF SECRETIONS PROVIDED,  O2 >94 MAINTAINED, MIKE AND EVONNE MIDLINES IN 
PLACED BOTH PATENT AND INTACT,  ON D5 NS AT 75CC/HR, TOLERATED WELL, SMITH CATH IN PLACE, 
DRAINING TO YELLOW  URINE, OTHERWISE NO SIGNIFICANT CHANGE IN CONDITION,  REPOSITION Q2H 
PROVIDED, BED LOCKED AND IN LOWEST POSITION, S/R OF BED UP X3, WILL ENDORSE CONTINUITY OF 
CARE TO ONCOMING NURSE.

## 2022-05-25 VITALS — DIASTOLIC BLOOD PRESSURE: 72 MMHG | SYSTOLIC BLOOD PRESSURE: 181 MMHG

## 2022-05-25 VITALS — DIASTOLIC BLOOD PRESSURE: 68 MMHG | SYSTOLIC BLOOD PRESSURE: 107 MMHG

## 2022-05-25 VITALS — DIASTOLIC BLOOD PRESSURE: 58 MMHG | SYSTOLIC BLOOD PRESSURE: 138 MMHG

## 2022-05-25 VITALS — SYSTOLIC BLOOD PRESSURE: 144 MMHG | DIASTOLIC BLOOD PRESSURE: 68 MMHG

## 2022-05-25 VITALS — SYSTOLIC BLOOD PRESSURE: 137 MMHG | DIASTOLIC BLOOD PRESSURE: 65 MMHG

## 2022-05-25 VITALS — SYSTOLIC BLOOD PRESSURE: 166 MMHG | DIASTOLIC BLOOD PRESSURE: 73 MMHG

## 2022-05-25 LAB
BASE EXCESS BLDA CALC-SCNC: 5.4 MMOL/L
BASOPHILS # BLD AUTO: 0 K/UL (ref 0–0.2)
BASOPHILS NFR BLD AUTO: 0.4 % (ref 0–2)
BUN SERPL-MCNC: 10 MG/DL (ref 7–18)
CALCIUM SERPL-MCNC: 8.7 MG/DL (ref 8.5–10.1)
CHLORIDE SERPL-SCNC: 102 MMOL/L (ref 98–107)
CO2 SERPL-SCNC: 28 MMOL/L (ref 21–32)
CREAT SERPL-MCNC: 0.7 MG/DL (ref 0.6–1.3)
DO-HGB MFR BLDA: 110.1 MMHG
EOSINOPHIL NFR BLD AUTO: 2.1 % (ref 0–6)
GLUCOSE SERPL-MCNC: 159 MG/DL (ref 74–106)
HCT VFR BLD AUTO: 43 % (ref 33–45)
HGB BLD-MCNC: 13 G/DL (ref 11.5–14.8)
INHALED O2 CONCENTRATION: 35 %
LYMPHOCYTES NFR BLD AUTO: 1.1 K/UL (ref 0.8–4.8)
LYMPHOCYTES NFR BLD AUTO: 11.5 % (ref 20–44)
MCHC RBC AUTO-ENTMCNC: 30 G/DL (ref 31–36)
MCV RBC AUTO: 86 FL (ref 82–100)
MONOCYTES NFR BLD AUTO: 1.1 K/UL (ref 0.1–1.3)
MONOCYTES NFR BLD AUTO: 11.8 % (ref 2–12)
NEUTROPHILS # BLD AUTO: 6.8 K/UL (ref 1.8–8.9)
NEUTROPHILS NFR BLD AUTO: 74.2 % (ref 43–81)
PCO2 TEMP ADJ BLDA: 44.4 MMHG (ref 35–45)
PH TEMP ADJ BLDA: 7.45 [PH] (ref 7.35–7.45)
PLATELET # BLD AUTO: 191 K/UL (ref 150–450)
PO2 TEMP ADJ BLDA: 87.8 MMHG (ref 75–100)
POTASSIUM SERPL-SCNC: 4.8 MMOL/L (ref 3.5–5.1)
RBC # BLD AUTO: 5.03 MIL/UL (ref 4–5.2)
SAO2 % BLDA: 96.6 % (ref 92–98.5)
SODIUM SERPL-SCNC: 138 MMOL/L (ref 136–145)
VENTILATION MODE VENT: (no result)
WBC NRBC COR # BLD AUTO: 9.2 K/UL (ref 4.3–11)

## 2022-05-25 RX ADMIN — DONEPEZIL HYDROCHLORIDE SCH MG: 5 TABLET ORAL at 21:48

## 2022-05-25 RX ADMIN — Medication PRN ML: at 18:10

## 2022-05-25 RX ADMIN — ALBUTEROL SULFATE SCH MG: 2.5 SOLUTION RESPIRATORY (INHALATION) at 08:34

## 2022-05-25 RX ADMIN — APIXABAN SCH MG: 2.5 TABLET, FILM COATED ORAL at 18:14

## 2022-05-25 RX ADMIN — ALBUTEROL SULFATE SCH MG: 2.5 SOLUTION RESPIRATORY (INHALATION) at 23:55

## 2022-05-25 RX ADMIN — ASPIRIN 81 MG SCH MG: 81 TABLET ORAL at 09:02

## 2022-05-25 RX ADMIN — METOPROLOL TARTRATE SCH MG: 50 TABLET, FILM COATED ORAL at 12:39

## 2022-05-25 RX ADMIN — AMIODARONE HYDROCHLORIDE SCH MG: 200 TABLET ORAL at 09:04

## 2022-05-25 RX ADMIN — METOPROLOL TARTRATE SCH MG: 50 TABLET, FILM COATED ORAL at 18:15

## 2022-05-25 RX ADMIN — PIPERACILLIN SODIUM AND TAZOBACTAM SODIUM SCH MLS/HR: .375; 3 INJECTION, POWDER, LYOPHILIZED, FOR SOLUTION INTRAVENOUS at 09:19

## 2022-05-25 RX ADMIN — ACETYLCYSTEINE SCH MG: 100 INHALANT RESPIRATORY (INHALATION) at 08:34

## 2022-05-25 RX ADMIN — ACETYLCYSTEINE SCH MG: 100 INHALANT RESPIRATORY (INHALATION) at 23:55

## 2022-05-25 RX ADMIN — FERROUS SULFATE TAB 325 MG (65 MG ELEMENTAL FE) SCH MG: 325 (65 FE) TAB at 18:08

## 2022-05-25 RX ADMIN — DEXTROSE AND SODIUM CHLORIDE PRN MLS/HR: 5; 900 INJECTION, SOLUTION INTRAVENOUS at 05:51

## 2022-05-25 RX ADMIN — ALBUTEROL SULFATE SCH MG: 2.5 SOLUTION RESPIRATORY (INHALATION) at 14:46

## 2022-05-25 RX ADMIN — AMIODARONE HYDROCHLORIDE SCH MG: 200 TABLET ORAL at 18:09

## 2022-05-25 RX ADMIN — FERROUS SULFATE TAB 325 MG (65 MG ELEMENTAL FE) SCH MG: 325 (65 FE) TAB at 09:11

## 2022-05-25 RX ADMIN — PIPERACILLIN SODIUM AND TAZOBACTAM SODIUM SCH MLS/HR: .375; 3 INJECTION, POWDER, LYOPHILIZED, FOR SOLUTION INTRAVENOUS at 01:09

## 2022-05-25 RX ADMIN — APIXABAN SCH MG: 2.5 TABLET, FILM COATED ORAL at 09:08

## 2022-05-25 RX ADMIN — FERROUS SULFATE TAB 325 MG (65 MG ELEMENTAL FE) SCH MG: 325 (65 FE) TAB at 12:39

## 2022-05-25 RX ADMIN — LISINOPRIL SCH MG: 10 TABLET ORAL at 09:10

## 2022-05-25 RX ADMIN — THERA TABS SCH UDTAB: TAB at 09:11

## 2022-05-25 RX ADMIN — ACETYLCYSTEINE SCH MG: 100 INHALANT RESPIRATORY (INHALATION) at 14:46

## 2022-05-25 RX ADMIN — PANTOPRAZOLE SODIUM SCH MG: 40 GRANULE, DELAYED RELEASE ORAL at 09:09

## 2022-05-25 RX ADMIN — VITAMIN D, TAB 1000IU (100/BT) SCH UNIT: 25 TAB at 09:10

## 2022-05-25 RX ADMIN — METOPROLOL TARTRATE SCH MG: 50 TABLET, FILM COATED ORAL at 09:10

## 2022-05-25 RX ADMIN — AMITRIPTYLINE HYDROCHLORIDE SCH MG: 25 TABLET, FILM COATED ORAL at 18:14

## 2022-05-25 NOTE — NUR
TELE RN CLOSING NOTE,

 

PATIENT IN BED, A/O X 0, NON VERBAL, NO SOB/ACUTE DISTRESS NOTED AT THIS TIME, CONT ON COOL 
AEROSOL  10L, 40% FIO2, ON TELE MONITORING CURRENTLY READING SR AT 60 BPM AT THIS TIME, WITH 
MIKE RUNNING NS AT TKO AND EVONNE MIDLINES RUNNING D5NS AT 75MLS/HR, SMITH CATH IN PLACE, 
DRAINING TO YELLOW COLORED URINE, ALL DUE MEDS GIVEN AS ORDERED, KEPT DRY AND CLEAN, 
REPOSITION Q2H, CALL LIGHT WITHIN REACH, LEESA IN LOCKED AND LOWEST POSITION, BED ALARM ON. 
WILL ENDORSE TO AM SHIFT FOR MAYE.

## 2022-05-25 NOTE — NUR
RN NOTE 



PATIENT NOTED TO HAVE A 2 SEC PAUSE ON CARDIAC MONITOR, DR ÁLVAREZ INFORMED. NO NEW ORDERS

## 2022-05-25 NOTE — NUR
TELE RN OPENING NOTE,

 

RECEIVED PATIENT IN BED, SLEEPING, A/O X 0 AND NON VERBAL. CONT ON COOL AEROSOL, 10L, 40% 
FIO. NO SIGNS OF ACUTE DISTRESS NOTED AT THIS TIME. NSR ON TELE MONITORING, SATING AT 97%. 
NOTED WITH MIKE IV ACCESS RUNNING NS TKO AND EVONNE MIDLINE INFUSING D5 NS AT 75MLS/HR. BOTH 
PATENT AND INTACT. SMITH CATH IN PLACE, DRAINING TO YELLOW COLORED URINE. ALL SAFETY 
MEASURES IN PLACE. CALL LIGHT WITHIN REACH, BED IN LOCKED AND LOWEST POSITION, BED ALARM ON. 
WILL CONTINUE TO MONITOR FOR ANY CHANGES.

## 2022-05-25 NOTE — NUR
TELE RN CLOSING NOTE,

 

PATIENT IN BED, A/O X 0, NON VERBAL, NO SOB/ACUTE DISTRESS NOTED AT THIS TIME, CONT ON COOL 
AEROSOL  10L, 40% FIO2, ON TELE MONITORING, WITH MIKE RUNNING NS AT TKO AND EVONNE MIDLINES 
RUNNING D5NS AT 75MLS/HR, SMITH CATH IN PLACE, DRAINING TO YELLOW COLORED URINE, ALL DUE 
MEDS GIVEN AS ORDERED, KEPT DRY AND CLEAN, REPOSITION Q2H, CALL LIGHT WITHIN REACH, LEESA IN 
LOCKED AND LOWEST POSITION, BED ALARM ON. WILL ENDORSE TO [facility] SHIFT FOR MAYE.

## 2022-05-25 NOTE — NUR
TELE RN OPENING NOTE

 

PATIENT IN BED, A/O X 0, NON VERBAL, NO SOB/ACUTE DISTRESS NOTED AT THIS TIME, CONT ON COOL 
AEROSOL  10L, ON TELE MONITORING CURRENTLY READING SR AT 60'S AT THIS TIME, WITH MIKE AND EVONNE 
MIDLINES IN PLACE, RUNNING D5NS AT 75MLS/HR, SMITH CATH IN PLACE, DRAINING TO YELLOW COLORED 
URINE, CALL LIGHT WITHIN REACH, BED LOCKED AND IN LOWEST POSITION, BED ALARM ON.

## 2022-05-26 VITALS — SYSTOLIC BLOOD PRESSURE: 135 MMHG | DIASTOLIC BLOOD PRESSURE: 57 MMHG

## 2022-05-26 VITALS — DIASTOLIC BLOOD PRESSURE: 73 MMHG | SYSTOLIC BLOOD PRESSURE: 150 MMHG

## 2022-05-26 VITALS — SYSTOLIC BLOOD PRESSURE: 157 MMHG | DIASTOLIC BLOOD PRESSURE: 69 MMHG

## 2022-05-26 VITALS — DIASTOLIC BLOOD PRESSURE: 70 MMHG | SYSTOLIC BLOOD PRESSURE: 137 MMHG

## 2022-05-26 VITALS — SYSTOLIC BLOOD PRESSURE: 154 MMHG | DIASTOLIC BLOOD PRESSURE: 76 MMHG

## 2022-05-26 VITALS — DIASTOLIC BLOOD PRESSURE: 91 MMHG | SYSTOLIC BLOOD PRESSURE: 136 MMHG

## 2022-05-26 LAB
BASOPHILS # BLD AUTO: 0 K/UL (ref 0–0.2)
BASOPHILS NFR BLD AUTO: 0.3 % (ref 0–2)
BUN SERPL-MCNC: 10 MG/DL (ref 7–18)
CALCIUM SERPL-MCNC: 8.5 MG/DL (ref 8.5–10.1)
CHLORIDE SERPL-SCNC: 103 MMOL/L (ref 98–107)
CO2 SERPL-SCNC: 30 MMOL/L (ref 21–32)
CREAT SERPL-MCNC: 0.6 MG/DL (ref 0.6–1.3)
EOSINOPHIL NFR BLD AUTO: 1.4 % (ref 0–6)
GLUCOSE SERPL-MCNC: 165 MG/DL (ref 74–106)
HCT VFR BLD AUTO: 35 % (ref 33–45)
HGB BLD-MCNC: 11.1 G/DL (ref 11.5–14.8)
LYMPHOCYTES NFR BLD AUTO: 0.5 K/UL (ref 0.8–4.8)
LYMPHOCYTES NFR BLD AUTO: 7.5 % (ref 20–44)
MCHC RBC AUTO-ENTMCNC: 31 G/DL (ref 31–36)
MCV RBC AUTO: 81 FL (ref 82–100)
MONOCYTES NFR BLD AUTO: 0.4 K/UL (ref 0.1–1.3)
MONOCYTES NFR BLD AUTO: 6.3 % (ref 2–12)
NEUTROPHILS # BLD AUTO: 5.2 K/UL (ref 1.8–8.9)
NEUTROPHILS NFR BLD AUTO: 84.5 % (ref 43–81)
PLATELET # BLD AUTO: 194 K/UL (ref 150–450)
POTASSIUM SERPL-SCNC: 4.5 MMOL/L (ref 3.5–5.1)
RBC # BLD AUTO: 4.4 MIL/UL (ref 4–5.2)
SODIUM SERPL-SCNC: 138 MMOL/L (ref 136–145)
WBC NRBC COR # BLD AUTO: 6.1 K/UL (ref 4.3–11)

## 2022-05-26 RX ADMIN — METOPROLOL TARTRATE SCH MG: 50 TABLET, FILM COATED ORAL at 13:06

## 2022-05-26 RX ADMIN — METOPROLOL TARTRATE SCH MG: 50 TABLET, FILM COATED ORAL at 09:12

## 2022-05-26 RX ADMIN — VITAMIN D, TAB 1000IU (100/BT) SCH UNIT: 25 TAB at 09:04

## 2022-05-26 RX ADMIN — APIXABAN SCH MG: 2.5 TABLET, FILM COATED ORAL at 17:19

## 2022-05-26 RX ADMIN — ACETYLCYSTEINE SCH MG: 100 INHALANT RESPIRATORY (INHALATION) at 15:25

## 2022-05-26 RX ADMIN — ACETYLCYSTEINE SCH MG: 100 INHALANT RESPIRATORY (INHALATION) at 08:28

## 2022-05-26 RX ADMIN — DEXTROSE AND SODIUM CHLORIDE PRN MLS/HR: 5; 900 INJECTION, SOLUTION INTRAVENOUS at 15:17

## 2022-05-26 RX ADMIN — LISINOPRIL SCH MG: 10 TABLET ORAL at 09:12

## 2022-05-26 RX ADMIN — AMIODARONE HYDROCHLORIDE SCH MG: 200 TABLET ORAL at 17:18

## 2022-05-26 RX ADMIN — METOPROLOL TARTRATE SCH MG: 50 TABLET, FILM COATED ORAL at 17:19

## 2022-05-26 RX ADMIN — FERROUS SULFATE TAB 325 MG (65 MG ELEMENTAL FE) SCH MG: 325 (65 FE) TAB at 09:05

## 2022-05-26 RX ADMIN — FERROUS SULFATE TAB 325 MG (65 MG ELEMENTAL FE) SCH MG: 325 (65 FE) TAB at 17:18

## 2022-05-26 RX ADMIN — DONEPEZIL HYDROCHLORIDE SCH MG: 5 TABLET ORAL at 22:59

## 2022-05-26 RX ADMIN — ALBUTEROL SULFATE SCH MG: 2.5 SOLUTION RESPIRATORY (INHALATION) at 08:28

## 2022-05-26 RX ADMIN — PANTOPRAZOLE SODIUM SCH MG: 40 GRANULE, DELAYED RELEASE ORAL at 09:03

## 2022-05-26 RX ADMIN — ALBUTEROL SULFATE SCH MG: 2.5 SOLUTION RESPIRATORY (INHALATION) at 15:25

## 2022-05-26 RX ADMIN — THERA TABS SCH UDTAB: TAB at 09:05

## 2022-05-26 RX ADMIN — AMIODARONE HYDROCHLORIDE SCH MG: 200 TABLET ORAL at 09:12

## 2022-05-26 RX ADMIN — FERROUS SULFATE TAB 325 MG (65 MG ELEMENTAL FE) SCH MG: 325 (65 FE) TAB at 13:05

## 2022-05-26 RX ADMIN — AMITRIPTYLINE HYDROCHLORIDE SCH MG: 25 TABLET, FILM COATED ORAL at 17:19

## 2022-05-26 RX ADMIN — APIXABAN SCH MG: 2.5 TABLET, FILM COATED ORAL at 09:13

## 2022-05-26 RX ADMIN — DEXTROSE AND SODIUM CHLORIDE PRN MLS/HR: 5; 900 INJECTION, SOLUTION INTRAVENOUS at 01:40

## 2022-05-26 RX ADMIN — ASPIRIN 81 MG SCH MG: 81 TABLET ORAL at 09:04

## 2022-05-26 NOTE — NUR
RN NOTE



RECEIVED PATIENT IN BED, AO X 1, IN NO ACUTE DISTRESS AT THIS TIME. RESPIRATIONS UNLABORED, 
SATURATION % ON 8L COOL AEROSOL VIA MASK AT 35% FIO2, SR ON THE MONITOR, HR IS 67. EVONNE 
MIDLINE AND MIKE MIDLINE BOTH PATENT AND FLUSHING WELL, NO S/S OF INFECTION. NOTED GTUBE 
INTACT POSITIVE PLACEMENT NOTED, NO RESIDUAL, WITH TUBE FEEDING OF JEVITY 1.2 AT 50 ML/HR. 
SMITH CATHETER IN PLACE, DRAINING TO A CLEAR, YELLOW OUTPUT. SAFETY MEASURES IMPLEMENTED. 
PATIENT BED ALARM IS ON. HEAD OF BED ELEVATED. BED IS LOCKED,  IN LOWEST POSITION AND SIDE 
RAILS UP. CALL LIGHT WITHIN REACH OF THE PATIENT. WILL CONTINUE TO MONITOR AND REASSESS FOR 
ANY CHANGES.

## 2022-05-26 NOTE — NUR
TELE RN CLOSING NOTE,

 

PATIENT IN BED, A/O X 0 AND NON VERBAL. CONT ON COOL AEROSOL, 10L, 40% FIO. NO SIGNS OF 
ACUTE DISTRESS NOTED. NSR ON TELE MONITORING, SATING AT 97%. NOTED WITH MIKE IV ACCESS 
RUNNING NS TKO AND EVONNE MIDLINE INFUSING D5 NS AT 75MLS/HR. BOTH PATENT AND INTACT. SMITH 
CATH IN PLACE, DRAINING TO YELLOW COLORED URINE. ALL DUE MEDS GIVEN. ALL SAFETY MEASURES 
FOLLOWED. CALL LIGHT WITHIN REACH, BED IN LOCKED AND LOWEST POSITION, BED ALARM ON. WILL 
ENDORSE TO AM SHIFT NURSE FOR MAYE.

## 2022-05-26 NOTE — NUR
RN NOTES



PT FOUND SEMI FOWLERS DISPLAYING NO S/S OF DISTRESS, FLACC = 0 AND BREATHING IS EVEN AND 
UNLABORED ON 10L O2 SIMPLE MASK. R UA ML AND L UA ML ARE PATIENT AND INTACT. SMITH CATH 
BELOW PATIENT DRAINING BY GRAVITY. VSS (SPO2 AND HR), RN WILL MONITOR AND TREAT THROUGHOUT 
SHIFT. SAFETY MEASURES IN PLACE, BED LOCKED AND IN LOWEST POSITION, SIDE RAILS UPX2, CLL 
LIGHT WITHIN REACH, BED ALARM ARMED.

## 2022-05-26 NOTE — NUR
RN NOTES



PT FOUND SEMI FOWLERS DISPLAYING NO S/S OF DISTRESS, FLACC = 0 AND BREATHING IS EVEN AND 
UNLABORED ON 10L O2 SIMPLE MASK. WHILE NOT INTERACTING WITH HOSPITAL STAFF, PT PULLED DOWN 
SIMPLE MASK, SPO2 REMAINS 99%, BX IDENTIFIED TO NIGHT SHIFT RN AND MADE AWARE. R UA ML AND L 
UA ML ARE PATIENT AND INTACT. SMITH CATH BELOW PATIENT DRAINING BY GRAVITY. SBAR AND REPORT 
GIVEN TO NIGHT SHIFT RN, ALL QUESTIONS ANSWERED. SAFETY MEASURES IN PLACE, BED LOCKED AND IN 
LOWEST POSITION, SIDE RAILS UPX2, CLL LIGHT WITHIN REACH, BED ALARM ARMED. PT ENDORSED IN 
STABLE CONDITION FOR MAYE.

## 2022-05-27 VITALS — DIASTOLIC BLOOD PRESSURE: 69 MMHG | SYSTOLIC BLOOD PRESSURE: 134 MMHG

## 2022-05-27 VITALS — SYSTOLIC BLOOD PRESSURE: 155 MMHG | DIASTOLIC BLOOD PRESSURE: 69 MMHG

## 2022-05-27 VITALS — DIASTOLIC BLOOD PRESSURE: 60 MMHG | SYSTOLIC BLOOD PRESSURE: 138 MMHG

## 2022-05-27 VITALS — DIASTOLIC BLOOD PRESSURE: 65 MMHG | SYSTOLIC BLOOD PRESSURE: 178 MMHG

## 2022-05-27 VITALS — SYSTOLIC BLOOD PRESSURE: 161 MMHG | DIASTOLIC BLOOD PRESSURE: 63 MMHG

## 2022-05-27 VITALS — DIASTOLIC BLOOD PRESSURE: 66 MMHG | SYSTOLIC BLOOD PRESSURE: 120 MMHG

## 2022-05-27 LAB
BASOPHILS # BLD AUTO: 0 K/UL (ref 0–0.2)
BASOPHILS NFR BLD AUTO: 0.4 % (ref 0–2)
BUN SERPL-MCNC: 14 MG/DL (ref 7–18)
CALCIUM SERPL-MCNC: 8.6 MG/DL (ref 8.5–10.1)
CHLORIDE SERPL-SCNC: 103 MMOL/L (ref 98–107)
CO2 SERPL-SCNC: 31 MMOL/L (ref 21–32)
CREAT SERPL-MCNC: 0.6 MG/DL (ref 0.6–1.3)
EOSINOPHIL NFR BLD AUTO: 1.5 % (ref 0–6)
GLUCOSE SERPL-MCNC: 129 MG/DL (ref 74–106)
HCT VFR BLD AUTO: 34 % (ref 33–45)
HGB BLD-MCNC: 11 G/DL (ref 11.5–14.8)
LYMPHOCYTES NFR BLD AUTO: 0.4 K/UL (ref 0.8–4.8)
LYMPHOCYTES NFR BLD AUTO: 7.1 % (ref 20–44)
MCHC RBC AUTO-ENTMCNC: 33 G/DL (ref 31–36)
MCV RBC AUTO: 80 FL (ref 82–100)
MONOCYTES NFR BLD AUTO: 0.3 K/UL (ref 0.1–1.3)
MONOCYTES NFR BLD AUTO: 5.4 % (ref 2–12)
NEUTROPHILS # BLD AUTO: 5.2 K/UL (ref 1.8–8.9)
NEUTROPHILS NFR BLD AUTO: 85.6 % (ref 43–81)
PLATELET # BLD AUTO: 186 K/UL (ref 150–450)
POTASSIUM SERPL-SCNC: 4.5 MMOL/L (ref 3.5–5.1)
RBC # BLD AUTO: 4.24 MIL/UL (ref 4–5.2)
SODIUM SERPL-SCNC: 138 MMOL/L (ref 136–145)
WBC NRBC COR # BLD AUTO: 6.1 K/UL (ref 4.3–11)

## 2022-05-27 RX ADMIN — ALBUTEROL SULFATE SCH MG: 2.5 SOLUTION RESPIRATORY (INHALATION) at 23:48

## 2022-05-27 RX ADMIN — APIXABAN SCH MG: 2.5 TABLET, FILM COATED ORAL at 08:28

## 2022-05-27 RX ADMIN — FERROUS SULFATE TAB 325 MG (65 MG ELEMENTAL FE) SCH MG: 325 (65 FE) TAB at 08:30

## 2022-05-27 RX ADMIN — AMIODARONE HYDROCHLORIDE SCH MG: 200 TABLET ORAL at 16:40

## 2022-05-27 RX ADMIN — METOPROLOL TARTRATE SCH MG: 50 TABLET, FILM COATED ORAL at 12:33

## 2022-05-27 RX ADMIN — FERROUS SULFATE TAB 325 MG (65 MG ELEMENTAL FE) SCH MG: 325 (65 FE) TAB at 16:41

## 2022-05-27 RX ADMIN — FERROUS SULFATE TAB 325 MG (65 MG ELEMENTAL FE) SCH MG: 325 (65 FE) TAB at 12:33

## 2022-05-27 RX ADMIN — AMIODARONE HYDROCHLORIDE SCH MG: 200 TABLET ORAL at 08:30

## 2022-05-27 RX ADMIN — ACETYLCYSTEINE SCH MG: 100 INHALANT RESPIRATORY (INHALATION) at 07:37

## 2022-05-27 RX ADMIN — APIXABAN SCH MG: 2.5 TABLET, FILM COATED ORAL at 16:42

## 2022-05-27 RX ADMIN — AMLODIPINE BESYLATE SCH MG: 5 TABLET ORAL at 16:40

## 2022-05-27 RX ADMIN — ALBUTEROL SULFATE SCH MG: 2.5 SOLUTION RESPIRATORY (INHALATION) at 00:11

## 2022-05-27 RX ADMIN — LISINOPRIL SCH MG: 10 TABLET ORAL at 08:29

## 2022-05-27 RX ADMIN — Medication PRN ML: at 12:03

## 2022-05-27 RX ADMIN — ACETYLCYSTEINE SCH MG: 100 INHALANT RESPIRATORY (INHALATION) at 00:11

## 2022-05-27 RX ADMIN — ALBUTEROL SULFATE SCH MG: 2.5 SOLUTION RESPIRATORY (INHALATION) at 15:51

## 2022-05-27 RX ADMIN — AMITRIPTYLINE HYDROCHLORIDE SCH MG: 25 TABLET, FILM COATED ORAL at 17:30

## 2022-05-27 RX ADMIN — ACETYLCYSTEINE SCH MG: 100 INHALANT RESPIRATORY (INHALATION) at 23:49

## 2022-05-27 RX ADMIN — DEXTROSE AND SODIUM CHLORIDE PRN MLS/HR: 5; 900 INJECTION, SOLUTION INTRAVENOUS at 05:39

## 2022-05-27 RX ADMIN — ALBUTEROL SULFATE SCH MG: 2.5 SOLUTION RESPIRATORY (INHALATION) at 07:37

## 2022-05-27 RX ADMIN — ACETYLCYSTEINE SCH MG: 100 INHALANT RESPIRATORY (INHALATION) at 15:51

## 2022-05-27 RX ADMIN — DONEPEZIL HYDROCHLORIDE SCH MG: 5 TABLET ORAL at 21:14

## 2022-05-27 RX ADMIN — ASPIRIN 81 MG SCH MG: 81 TABLET ORAL at 08:29

## 2022-05-27 RX ADMIN — PANTOPRAZOLE SODIUM SCH MG: 40 GRANULE, DELAYED RELEASE ORAL at 08:29

## 2022-05-27 RX ADMIN — METOPROLOL TARTRATE SCH MG: 50 TABLET, FILM COATED ORAL at 16:40

## 2022-05-27 RX ADMIN — VITAMIN D, TAB 1000IU (100/BT) SCH UNIT: 25 TAB at 08:29

## 2022-05-27 RX ADMIN — METOPROLOL TARTRATE SCH MG: 50 TABLET, FILM COATED ORAL at 08:31

## 2022-05-27 RX ADMIN — THERA TABS SCH UDTAB: TAB at 08:29

## 2022-05-27 NOTE — NUR
RN OPENING NOTE



PATIENT IS IN BED, EYES OPEN. PATIENT NOT RESPONDING VERBALLY AT THIS TIME. PATIENT ON 3LPM 
VIA NC, 98% O2 SATURATION. PATIENT ON TELE MONITOR, SR 65 BPM. PATIENT HAS RESTRAINTS ON NAIF 
WRIST, SOFT. NO REDNESS OR INJURY NOTE D ON SITE. IV ACCESS PATENT AND INTACT, ON SALINE 
LOCK ONLY. JEVITY 1.2 ONGOING WITH 50 ML/HR, NO RESIDUAL, TOLERATING WELL. SAFETY MEASURES 
IN PLACE: BED LOCKED AND IN LOWEST POSITION, CALL LIGHT WITHIN REACH, SIDE RAILS UP. WILL 
MONITOR PATIENT CLOSELY.

## 2022-05-27 NOTE — NUR
RN OPENING NOTES 



RECEIVED PATIENT IN BED WITH EYES CLOSED, NON-VERBAL, BUT OPENS EYES TO TOUCH. PATIENT ON 8L 
COOL AEROSOL VIA MASK, FIO2 35%. NO S/S OF RESPIRATORY DISTRESS NOTED. PATIENT WITH G-TUBE 
IN PLACE. FEEDING OF JEVITY 1.2 @50 ML/HR RUNNING, TOLERATING WELL. IV ACCESS  RIGHT UPPER 
ARM MIDLINE INTACT AND PATENT, SL AND LEFT UPPER ARM MIDLINE #18 G, INTACT AND PATENT WITH 
D5 1/2 NS @50ML/HR INFUSING WELL. SMITH CATHETER IN PLACE AND DRAINING YELLOW URINE. 
ASPIRATION AND SAFETY MEASURES IN PLACE: HOB ELEVATED AT ALL TIMES, BED LOCKED IN LOWEST 
POSITION, BED ALARM ON, CALL LIGHT WITHIN REACH, SIDE RAILS UP X2. WILL CONTINUE TO MONITOR 
PATIENT.

## 2022-05-27 NOTE — NUR
RN CLOSING NOTES



PATIENT RESTING IN BED. PATIENT TOLERATING O2 @ 3LPM VIA N/C. NO S/S OF RESPIRATORY DISTRESS 
NOTED. spo2 100%. SUCTIONED SECRETIONS AS NEEDED. G-TUBE IN PLACE, INTACT, WITH FEEDING OF 
JEVITY 1.2 @50 ML/HR RUNNING, TOLERATING WELL. IV ACCESS  RIGHT UPPER ARM MIDLINE AND LEFT 
UPPER ARM MIDLINE #18 G, INTACT AND PATENT SALINE LOCKED. WITH SMITH CATHETER IN PLACE AND 
DRAINING  CLEAR YELLOW URINE. ASPIRATION AND SAFETY MEASURES IN PLACE: HOB ELEVATED AT ALL 
TIMES, BED LOCKED IN LOWEST POSITION, BED ALARM ON, CALL LIGHT WITHIN REACH, SIDE RAILS UP 
X2. WILL ENDORSE TO NEXT SHIFT FOR CONTINUITY OF CARE.

## 2022-05-27 NOTE — NUR
RN NOTE



PATIENT KEPT TRYING TO REMOVE O2 MASK, AND PULL ON PEG TUBE. DR LEVIN WAS NOTIFIED, ORDERS 
RECEIVED FOR B SOFT WRIST RESTRAINTS. SKIN AND CIRCULATION CHECKED AND ARE WNL. WILL CONT TO 
MONITOR

## 2022-05-27 NOTE — NUR
PATIENT RECEIVED ON COOL MIST @ 35% FIO2. SATURATIONS AT %. Q8 ALB AND MUCOMYST HHN TX 
TOLERATING WELL WITH NO ADVERSE REACTIONS NOTED. TITRATED TO 3LNC WITH NO DESATURATIONS 
NOTED. NT SUCTION DONE AS PATIENT CANNOT EXPECTORATE OWN SECRETIONS.

## 2022-05-28 VITALS — DIASTOLIC BLOOD PRESSURE: 61 MMHG | SYSTOLIC BLOOD PRESSURE: 132 MMHG

## 2022-05-28 VITALS — SYSTOLIC BLOOD PRESSURE: 132 MMHG | DIASTOLIC BLOOD PRESSURE: 66 MMHG

## 2022-05-28 VITALS — SYSTOLIC BLOOD PRESSURE: 163 MMHG | DIASTOLIC BLOOD PRESSURE: 71 MMHG

## 2022-05-28 VITALS — DIASTOLIC BLOOD PRESSURE: 69 MMHG | SYSTOLIC BLOOD PRESSURE: 150 MMHG

## 2022-05-28 VITALS — DIASTOLIC BLOOD PRESSURE: 51 MMHG | SYSTOLIC BLOOD PRESSURE: 125 MMHG

## 2022-05-28 LAB
BASOPHILS # BLD AUTO: 0 K/UL (ref 0–0.2)
BASOPHILS NFR BLD AUTO: 0.2 % (ref 0–2)
BUN SERPL-MCNC: 17 MG/DL (ref 7–18)
CALCIUM SERPL-MCNC: 8.8 MG/DL (ref 8.5–10.1)
CHLORIDE SERPL-SCNC: 98 MMOL/L (ref 98–107)
CO2 SERPL-SCNC: 29 MMOL/L (ref 21–32)
CREAT SERPL-MCNC: 0.6 MG/DL (ref 0.6–1.3)
EOSINOPHIL NFR BLD AUTO: 0.7 % (ref 0–6)
GLUCOSE SERPL-MCNC: 150 MG/DL (ref 74–106)
HCT VFR BLD AUTO: 35 % (ref 33–45)
HGB BLD-MCNC: 11.3 G/DL (ref 11.5–14.8)
LYMPHOCYTES NFR BLD AUTO: 0.6 K/UL (ref 0.8–4.8)
LYMPHOCYTES NFR BLD AUTO: 6.1 % (ref 20–44)
MCHC RBC AUTO-ENTMCNC: 32 G/DL (ref 31–36)
MCV RBC AUTO: 81 FL (ref 82–100)
MONOCYTES NFR BLD AUTO: 0.5 K/UL (ref 0.1–1.3)
MONOCYTES NFR BLD AUTO: 4.7 % (ref 2–12)
NEUTROPHILS # BLD AUTO: 8.7 K/UL (ref 1.8–8.9)
NEUTROPHILS NFR BLD AUTO: 88.3 % (ref 43–81)
PLATELET # BLD AUTO: 168 K/UL (ref 150–450)
POTASSIUM SERPL-SCNC: 4.6 MMOL/L (ref 3.5–5.1)
RBC # BLD AUTO: 4.38 MIL/UL (ref 4–5.2)
SODIUM SERPL-SCNC: 134 MMOL/L (ref 136–145)
WBC NRBC COR # BLD AUTO: 9.9 K/UL (ref 4.3–11)

## 2022-05-28 RX ADMIN — APIXABAN SCH MG: 2.5 TABLET, FILM COATED ORAL at 08:49

## 2022-05-28 RX ADMIN — FERROUS SULFATE TAB 325 MG (65 MG ELEMENTAL FE) SCH MG: 325 (65 FE) TAB at 17:14

## 2022-05-28 RX ADMIN — AMIODARONE HYDROCHLORIDE SCH MG: 200 TABLET ORAL at 17:14

## 2022-05-28 RX ADMIN — THERA TABS SCH UDTAB: TAB at 08:48

## 2022-05-28 RX ADMIN — VITAMIN D, TAB 1000IU (100/BT) SCH UNIT: 25 TAB at 08:47

## 2022-05-28 RX ADMIN — METOPROLOL TARTRATE SCH MG: 50 TABLET, FILM COATED ORAL at 13:05

## 2022-05-28 RX ADMIN — DONEPEZIL HYDROCHLORIDE SCH MG: 5 TABLET ORAL at 21:39

## 2022-05-28 RX ADMIN — ALBUTEROL SULFATE SCH MG: 2.5 SOLUTION RESPIRATORY (INHALATION) at 15:29

## 2022-05-28 RX ADMIN — FERROUS SULFATE TAB 325 MG (65 MG ELEMENTAL FE) SCH MG: 325 (65 FE) TAB at 13:05

## 2022-05-28 RX ADMIN — AMIODARONE HYDROCHLORIDE SCH MG: 200 TABLET ORAL at 08:48

## 2022-05-28 RX ADMIN — ALBUTEROL SULFATE SCH MG: 2.5 SOLUTION RESPIRATORY (INHALATION) at 23:50

## 2022-05-28 RX ADMIN — ALBUTEROL SULFATE SCH MG: 2.5 SOLUTION RESPIRATORY (INHALATION) at 07:44

## 2022-05-28 RX ADMIN — AMLODIPINE BESYLATE SCH MG: 5 TABLET ORAL at 08:47

## 2022-05-28 RX ADMIN — ACETYLCYSTEINE SCH MG: 100 INHALANT RESPIRATORY (INHALATION) at 15:29

## 2022-05-28 RX ADMIN — METOPROLOL TARTRATE SCH MG: 50 TABLET, FILM COATED ORAL at 08:50

## 2022-05-28 RX ADMIN — LISINOPRIL SCH MG: 10 TABLET ORAL at 08:47

## 2022-05-28 RX ADMIN — PANTOPRAZOLE SODIUM SCH MG: 40 GRANULE, DELAYED RELEASE ORAL at 08:47

## 2022-05-28 RX ADMIN — Medication PRN ML: at 14:44

## 2022-05-28 RX ADMIN — APIXABAN SCH MG: 2.5 TABLET, FILM COATED ORAL at 17:15

## 2022-05-28 RX ADMIN — AMLODIPINE BESYLATE SCH MG: 5 TABLET ORAL at 17:15

## 2022-05-28 RX ADMIN — METOPROLOL TARTRATE SCH MG: 50 TABLET, FILM COATED ORAL at 17:14

## 2022-05-28 RX ADMIN — ACETYLCYSTEINE SCH MG: 100 INHALANT RESPIRATORY (INHALATION) at 07:44

## 2022-05-28 RX ADMIN — FERROUS SULFATE TAB 325 MG (65 MG ELEMENTAL FE) SCH MG: 325 (65 FE) TAB at 08:49

## 2022-05-28 RX ADMIN — AMITRIPTYLINE HYDROCHLORIDE SCH MG: 25 TABLET, FILM COATED ORAL at 17:15

## 2022-05-28 RX ADMIN — ACETYLCYSTEINE SCH MG: 100 INHALANT RESPIRATORY (INHALATION) at 23:50

## 2022-05-28 RX ADMIN — ASPIRIN 81 MG SCH MG: 81 TABLET ORAL at 08:51

## 2022-05-28 NOTE — NUR
RN CLOSE NOTE



PATIENT IN BED, IN NO ACUTE DISTRESS. STILL NOTED CONGESTION COUGH, SUCTION AND ORAL CARE 
PROVIDED. RESPIRATION EVEN AND UNLABORED ON OXYGEN AT 3LS VIA N/C. SKIN IS WARM TO TOUCH 
KEEP CLEAN//DRY, INTACT IV SITE. KEPT ELEVATED HOB FOR ENSURE AIRWAY AND ASPIRATION 
PRECAUTION. ALSO LOWEST POSITION OF THE BED FOR SAFETY. CALL LIGHT WITHIN REACH, WILL 
ENDORSE TO NIGHT SHIFT.

## 2022-05-28 NOTE — NUR
RN OPENING NOTES



RECEIVED PATIENT ON BED,  OPEN EYES, MUMBLES, CONFUSED, ON NASAL CANNULA @ 3LPM SATING AT 
96%,  RESPIRATORY EVEN AND UNLABORED, NO SOB NOTED, NOT IN ACUTE DISTRESS. REMAIN AFEBRILE. 
NOTED WITH EVONNE MID  LINE,  PATENT, INTACT. FLUSHED WITH NS, NO S/S OF INFILTRATION NOTED AT 
SITE. WITH PEG TUBE, PATENT INTACT,  VERIFIED PLACEMENT BY AUSCULTATION, NO RESIDUAL NOTED 
UPON ASPIRATION, RUNNING WITH JEVITY 1.2 @ 50 ML/HR, HEAD OF BED KEPT ELEVATED. ON SMITH 
CATHETER PATENT, INTACT DRAINING WITH CLEAR YELLOW URINE VIA GRAVITY. WITH BILATERAL SOFT 
WRIST RESTRAIN. ALL SAFETY MEASURE PROVIDED. BED IN LOWEST POSITION, LOCKED. BED ALARM 
ARMED. CALL LIGHT WITH IN REACH. CONTINUE TO MONITOR.

## 2022-05-28 NOTE — NUR
RN CLOSING NOTE



PATIENT IS IN BED, EYES OPEN. PATIENT MUMBLES. PATIENT ON 3LPM VIA NC, 95-98% O2 SATURATION. 
SUCTIONED PATIENT VIA NASOTRACHEAL SUCTIONING AS NEEDED. PATIENT ON TELE MONITOR, SR 69 BPM. 
PATIENT HAS RESTRAINTS ON NAIF WRIST, SOFT. NO REDNESS OR INJURY DURING THE SHIFT, PATIENT 
TRIES TO PULL ON GT AND TAKE OFF O2.R UPPER ARM AND L UPPER ARM MIDLINE PATENT AND INTACT, 
FLUSHING WELL. JEVITY 1.2 ONGOING WITH 50 ML/HR, WITH 10 ML RESIDUAL, TOLERATING WELL. 
SAFETY MEASURES IN PLACE: BED LOCKED AND IN LOWEST POSITION, CALL LIGHT WITHIN REACH, SIDE 
RAILS UP. ALL NEEDS MET AND ATTENDED. ALL ORDERS CARRIED OUT. WILL ENDORSE TO DAY SHIFT 
NURSE FOR MAYE.

## 2022-05-29 VITALS — DIASTOLIC BLOOD PRESSURE: 47 MMHG | SYSTOLIC BLOOD PRESSURE: 106 MMHG

## 2022-05-29 VITALS — DIASTOLIC BLOOD PRESSURE: 49 MMHG | SYSTOLIC BLOOD PRESSURE: 112 MMHG

## 2022-05-29 VITALS — DIASTOLIC BLOOD PRESSURE: 50 MMHG | SYSTOLIC BLOOD PRESSURE: 121 MMHG

## 2022-05-29 VITALS — DIASTOLIC BLOOD PRESSURE: 65 MMHG | SYSTOLIC BLOOD PRESSURE: 120 MMHG

## 2022-05-29 VITALS — SYSTOLIC BLOOD PRESSURE: 121 MMHG | DIASTOLIC BLOOD PRESSURE: 62 MMHG

## 2022-05-29 VITALS — DIASTOLIC BLOOD PRESSURE: 47 MMHG | SYSTOLIC BLOOD PRESSURE: 105 MMHG

## 2022-05-29 RX ADMIN — AMLODIPINE BESYLATE SCH MG: 5 TABLET ORAL at 16:44

## 2022-05-29 RX ADMIN — ALBUTEROL SULFATE SCH MG: 2.5 SOLUTION RESPIRATORY (INHALATION) at 08:28

## 2022-05-29 RX ADMIN — ACETYLCYSTEINE SCH MG: 100 INHALANT RESPIRATORY (INHALATION) at 08:28

## 2022-05-29 RX ADMIN — AMIODARONE HYDROCHLORIDE SCH MG: 200 TABLET ORAL at 09:15

## 2022-05-29 RX ADMIN — ASPIRIN 81 MG SCH MG: 81 TABLET ORAL at 09:14

## 2022-05-29 RX ADMIN — PANTOPRAZOLE SODIUM SCH MG: 40 GRANULE, DELAYED RELEASE ORAL at 09:14

## 2022-05-29 RX ADMIN — METOPROLOL TARTRATE SCH MG: 50 TABLET, FILM COATED ORAL at 13:08

## 2022-05-29 RX ADMIN — METOPROLOL TARTRATE SCH MG: 50 TABLET, FILM COATED ORAL at 09:15

## 2022-05-29 RX ADMIN — ACETYLCYSTEINE SCH MG: 100 INHALANT RESPIRATORY (INHALATION) at 23:15

## 2022-05-29 RX ADMIN — THERA TABS SCH UDTAB: TAB at 09:14

## 2022-05-29 RX ADMIN — VITAMIN D, TAB 1000IU (100/BT) SCH UNIT: 25 TAB at 09:14

## 2022-05-29 RX ADMIN — APIXABAN SCH MG: 2.5 TABLET, FILM COATED ORAL at 09:17

## 2022-05-29 RX ADMIN — FERROUS SULFATE TAB 325 MG (65 MG ELEMENTAL FE) SCH MG: 325 (65 FE) TAB at 09:14

## 2022-05-29 RX ADMIN — ALBUTEROL SULFATE SCH MG: 2.5 SOLUTION RESPIRATORY (INHALATION) at 15:52

## 2022-05-29 RX ADMIN — ACETYLCYSTEINE SCH MG: 100 INHALANT RESPIRATORY (INHALATION) at 15:52

## 2022-05-29 RX ADMIN — AMIODARONE HYDROCHLORIDE SCH MG: 200 TABLET ORAL at 16:44

## 2022-05-29 RX ADMIN — LISINOPRIL SCH MG: 10 TABLET ORAL at 09:16

## 2022-05-29 RX ADMIN — METOPROLOL TARTRATE SCH MG: 50 TABLET, FILM COATED ORAL at 16:43

## 2022-05-29 RX ADMIN — DONEPEZIL HYDROCHLORIDE SCH MG: 5 TABLET ORAL at 21:04

## 2022-05-29 RX ADMIN — AMLODIPINE BESYLATE SCH MG: 5 TABLET ORAL at 09:23

## 2022-05-29 RX ADMIN — ALBUTEROL SULFATE SCH MG: 2.5 SOLUTION RESPIRATORY (INHALATION) at 23:15

## 2022-05-29 RX ADMIN — FERROUS SULFATE TAB 325 MG (65 MG ELEMENTAL FE) SCH MG: 325 (65 FE) TAB at 16:43

## 2022-05-29 RX ADMIN — FERROUS SULFATE TAB 325 MG (65 MG ELEMENTAL FE) SCH MG: 325 (65 FE) TAB at 13:08

## 2022-05-29 RX ADMIN — APIXABAN SCH MG: 2.5 TABLET, FILM COATED ORAL at 16:43

## 2022-05-29 RX ADMIN — Medication PRN ML: at 11:10

## 2022-05-29 RX ADMIN — AMITRIPTYLINE HYDROCHLORIDE SCH MG: 25 TABLET, FILM COATED ORAL at 17:11

## 2022-05-29 NOTE — NUR
RN NOTES





PATIENT REMAIN STABLE THROUGH OUT THE SHIFT,  RESPIRATORY EVEN AND UNLABORED, NO SOB NOTED, 
NOT IN ACUTE DISTRESS. REMAIN AFEBRILE. PEG TUBE, PATENT INTACT,  RUNNING WITH JEVITY 1.2 @ 
50 ML/HR, HEAD OF BED KEPT ELEVATED. ON SMITH CATHETER PATENT, INTACT DRAINING WITH CLEAR 
YELLOW URINE VIA GRAVITY. WITH BILATERAL SOFT WRIST RESTRAIN. ALL DUE MEDS GIVEN AS PER MD'S 
ORDERED. ALL SAFETY MEASURE PROVIDED. BED IN LOWEST POSITION, LOCKED. BED ALARM ARMED. CALL 
LIGHT WITH IN REACH. REPORT GIVEN TO MORNING SHIFT NURSE.

## 2022-05-29 NOTE — NUR
TELE RN NOTE 

 PATIENT IN BED AWAKE BUT CONFUSED , ON 3L NC SATURATION 95% AT THIS TIME ,  ON TELE MONITOR 
SR , HT 74,  WITH G TUBE  FEEDING AS ORDERED ,KEEP HOB ELEVATED AT ALL TIME, BED IN LOWEST 
AND LOCKED POSITION , WILL MONITOR CLOSELY

## 2022-05-29 NOTE — NUR
TELE RN OPENING NOTE



RECEIVED PATIENT IS IN BED, NON VERBAL, EYES OPEN.  ON O2 3LPM VIA NC, TOLERATING WELL, 
SATING AT 95%. PATIENT ON TELE MONITOR, SR 74 BPM.  SOFT WRIST RESTRAINTS NOTED ON NAIF 
HANDS. NO REDNESS OR SKIN COMPROMISE NOTED ON SITE. IV ACCESS IN EVONNE MIDLINE , PATENT AND 
INTACT, ON SALINE LOCK ONLY. ON GTF RUNNING JEVITY 1.2 AT 50 ML/HR, NO RESIDUAL NOTED, 
TOLERATING WELL. KEPT HEAD OF THE BED ELEVATED. ALL SAFETY MEASURES IN PLACE: BED LOCKED AND 
IN LOWEST POSITION, CALL LIGHT WITHIN REACH, SIDE RAILS UP X 2. WILL CONTINUE TO MONITOR 
PATIENT CLOSELY.

## 2022-05-29 NOTE — NUR
TELE RN NOTE 

 DR OH AT [facility] WON TO ORDER TO HOSPICE CARE , WILL TRY TO CONTACT RUPAL  AND RENATA 
  

-------------------------------------------------------------------------------

Addendum: 05/29/22 at 1428 by ROWDY TAN RN

-------------------------------------------------------------------------------

WRONG ENTRY

## 2022-05-29 NOTE — NUR
tele rn note

 turn reposition done ,cont on 3l m of nc , so sob noted at this time,will cont to monitor

## 2022-05-29 NOTE — NUR
tele rn note 

still with soft restrain trying to remove all lines , all needs attended call light within 
reach

## 2022-05-29 NOTE — NUR
tele rn note 

 oral care done, oral suction done , saturation 95% at this time , family at bedside, keep 
hob elevated at all time, will monitor

## 2022-05-29 NOTE — NUR
TELE RN NOTES





PATIENT IN BED SLEEPING, NON VERBAL. RESPONSIVE TO TOUCH AND VERBAL STIMULI , OPENS EYES. PT 
ON O2 NC 3LPM TOLERATING WELL WITH SATURATION OF 94%. BREATHING EVEN AND UNLABORED.ON 
TELEMONITORING CURRENTLY READING SR AT 90'S, IV ACCESS AT EVONNE MIDLINE, LEFT AC 18 GAUGE, NO 
SIGNS OF INFILTRATION. ON G TUBE FEEDING, GLUCERNA AT 40ML/HR AND TOLERATING WELL, NO 
RESIDUAL NOTED AND POSITIVE PLACEMENT NOTED. SMITH CATHETER PATENT DRAINING YELLOWISH URINE 
OUTPUTKEPT DRY AND CLEAN, ALL SAFETY MEASURES IN PLACE, CALL LIGHT WITHIN REACH. BED LOCKED 
IN LOWEST POSITION, ALARM ON.

## 2022-05-30 VITALS — DIASTOLIC BLOOD PRESSURE: 53 MMHG | SYSTOLIC BLOOD PRESSURE: 121 MMHG

## 2022-05-30 VITALS — DIASTOLIC BLOOD PRESSURE: 53 MMHG | SYSTOLIC BLOOD PRESSURE: 154 MMHG

## 2022-05-30 VITALS — DIASTOLIC BLOOD PRESSURE: 52 MMHG | SYSTOLIC BLOOD PRESSURE: 128 MMHG

## 2022-05-30 VITALS — SYSTOLIC BLOOD PRESSURE: 142 MMHG | DIASTOLIC BLOOD PRESSURE: 60 MMHG

## 2022-05-30 LAB
BASOPHILS # BLD AUTO: 0 K/UL (ref 0–0.2)
BASOPHILS NFR BLD AUTO: 0.4 % (ref 0–2)
BUN SERPL-MCNC: 24 MG/DL (ref 7–18)
CALCIUM SERPL-MCNC: 8.8 MG/DL (ref 8.5–10.1)
CHLORIDE SERPL-SCNC: 98 MMOL/L (ref 98–107)
CO2 SERPL-SCNC: 31 MMOL/L (ref 21–32)
CREAT SERPL-MCNC: 0.7 MG/DL (ref 0.6–1.3)
EOSINOPHIL NFR BLD AUTO: 1.3 % (ref 0–6)
GLUCOSE SERPL-MCNC: 154 MG/DL (ref 74–106)
HCT VFR BLD AUTO: 32 % (ref 33–45)
HGB BLD-MCNC: 10.3 G/DL (ref 11.5–14.8)
LYMPHOCYTES NFR BLD AUTO: 0.5 K/UL (ref 0.8–4.8)
LYMPHOCYTES NFR BLD AUTO: 7.2 % (ref 20–44)
MCHC RBC AUTO-ENTMCNC: 33 G/DL (ref 31–36)
MCV RBC AUTO: 79 FL (ref 82–100)
MONOCYTES NFR BLD AUTO: 0.5 K/UL (ref 0.1–1.3)
MONOCYTES NFR BLD AUTO: 6.4 % (ref 2–12)
NEUTROPHILS # BLD AUTO: 6.2 K/UL (ref 1.8–8.9)
NEUTROPHILS NFR BLD AUTO: 84.7 % (ref 43–81)
PLATELET # BLD AUTO: 136 K/UL (ref 150–450)
POTASSIUM SERPL-SCNC: 4.8 MMOL/L (ref 3.5–5.1)
RBC # BLD AUTO: 4.02 MIL/UL (ref 4–5.2)
SODIUM SERPL-SCNC: 135 MMOL/L (ref 136–145)
WBC NRBC COR # BLD AUTO: 7.3 K/UL (ref 4.3–11)

## 2022-05-30 RX ADMIN — ALBUTEROL SULFATE SCH MG: 2.5 SOLUTION RESPIRATORY (INHALATION) at 14:30

## 2022-05-30 RX ADMIN — APIXABAN SCH MG: 2.5 TABLET, FILM COATED ORAL at 08:44

## 2022-05-30 RX ADMIN — ACETYLCYSTEINE SCH MG: 100 INHALANT RESPIRATORY (INHALATION) at 08:01

## 2022-05-30 RX ADMIN — ACETYLCYSTEINE SCH MG: 100 INHALANT RESPIRATORY (INHALATION) at 14:29

## 2022-05-30 RX ADMIN — PANTOPRAZOLE SODIUM SCH MG: 40 GRANULE, DELAYED RELEASE ORAL at 08:39

## 2022-05-30 RX ADMIN — VITAMIN D, TAB 1000IU (100/BT) SCH UNIT: 25 TAB at 08:39

## 2022-05-30 RX ADMIN — ASPIRIN 81 MG SCH MG: 81 TABLET ORAL at 08:40

## 2022-05-30 RX ADMIN — ALBUTEROL SULFATE SCH MG: 2.5 SOLUTION RESPIRATORY (INHALATION) at 08:01

## 2022-05-30 RX ADMIN — FERROUS SULFATE TAB 325 MG (65 MG ELEMENTAL FE) SCH MG: 325 (65 FE) TAB at 13:14

## 2022-05-30 RX ADMIN — FERROUS SULFATE TAB 325 MG (65 MG ELEMENTAL FE) SCH MG: 325 (65 FE) TAB at 08:41

## 2022-05-30 RX ADMIN — LISINOPRIL SCH MG: 10 TABLET ORAL at 08:41

## 2022-05-30 RX ADMIN — Medication PRN ML: at 12:59

## 2022-05-30 RX ADMIN — METOPROLOL TARTRATE SCH MG: 50 TABLET, FILM COATED ORAL at 13:14

## 2022-05-30 RX ADMIN — AMIODARONE HYDROCHLORIDE SCH MG: 200 TABLET ORAL at 08:46

## 2022-05-30 RX ADMIN — THERA TABS SCH UDTAB: TAB at 08:40

## 2022-05-30 RX ADMIN — AMLODIPINE BESYLATE SCH MG: 5 TABLET ORAL at 08:40

## 2022-05-30 RX ADMIN — METOPROLOL TARTRATE SCH MG: 50 TABLET, FILM COATED ORAL at 08:41

## 2022-05-30 NOTE — NUR
TELE RN CLOSING NOTE



NO SIGNIFICANT CHANGE THROUGHOUT THE SHIFT. PT REMAINED STABLE IN BED, NON VERBAL, EYES 
OPEN.  ON O2 3LPM VIA NC, TOLERATING WELL, SATING AT 99%. PATIENT ON TELE MONITOR, SR 81 
BPM.  SOFT WRIST RESTRAINTS NOTED ON NAIF HANDS. NO REDNESS OR SKIN COMPROMISE NOTED ON SITE. 
IV ACCESS IN EVONNE MIDLINE , PATENT AND INTACT, ON SALINE LOCK ONLY. ON GTF RUNNING JEVITY 1.2 
AT 50 ML/HR, TOLERATING WELL. ALL DUE MEDS GIVEN. AM/PM CARE DONE. WOUND CARE DONE. ALL 
SAFETY MEASURES IMPLEMENTED BED LOCKED AND IN LOWEST POSITION, CALL LIGHT WITHIN REACH, SIDE 
RAILS UP X 2. WILL ENDORSE TO AM SHIFT NURSE FOR MAYE.

## 2022-05-30 NOTE — NUR
RN discharge notes:

pt was accepted at [facility], report given by phone to Romario ORTA, AM [facility] came 
to  pt, body checked and vital signs done, IV line discontinued, daughter in law at 
bedside said she had no belonging, left with oxygen at 2 liter via nasal canula in stable 
condition

## 2022-06-18 ENCOUNTER — HOSPITAL ENCOUNTER (EMERGENCY)
Dept: HOSPITAL 54 - ER | Age: 87
Discharge: NURSING, REHAB OR LONG-TERM CARE | End: 2022-06-18
Payer: MEDICARE

## 2022-06-18 VITALS — HEIGHT: 66 IN | WEIGHT: 117.31 LBS | BODY MASS INDEX: 18.85 KG/M2

## 2022-06-18 VITALS — DIASTOLIC BLOOD PRESSURE: 75 MMHG | SYSTOLIC BLOOD PRESSURE: 128 MMHG

## 2022-06-18 DIAGNOSIS — I10: ICD-10-CM

## 2022-06-18 DIAGNOSIS — Z79.899: ICD-10-CM

## 2022-06-18 DIAGNOSIS — K94.23: Primary | ICD-10-CM

## 2022-06-18 DIAGNOSIS — E78.5: ICD-10-CM

## 2022-06-18 DIAGNOSIS — I48.91: ICD-10-CM

## 2022-06-18 DIAGNOSIS — Z86.69: ICD-10-CM

## 2022-06-18 PROCEDURE — 43762 RPLC GTUBE NO REVJ TRC: CPT

## 2022-06-18 PROCEDURE — 99284 EMERGENCY DEPT VISIT MOD MDM: CPT

## 2022-06-18 NOTE — NUR
Patient discharged to [facility] in stable condition. Picked up by 2 emts at 
bedside via steve.

## 2022-11-21 ENCOUNTER — HOSPITAL ENCOUNTER (INPATIENT)
Dept: HOSPITAL 54 - ER | Age: 87
LOS: 7 days | Discharge: NURSING, REHAB OR LONG-TERM CARE | DRG: 871 | End: 2022-11-28
Attending: INTERNAL MEDICINE | Admitting: INTERNAL MEDICINE
Payer: MEDICARE

## 2022-11-21 VITALS — SYSTOLIC BLOOD PRESSURE: 101 MMHG | DIASTOLIC BLOOD PRESSURE: 53 MMHG

## 2022-11-21 VITALS — HEIGHT: 61 IN | BODY MASS INDEX: 21.14 KG/M2 | WEIGHT: 112 LBS

## 2022-11-21 VITALS — SYSTOLIC BLOOD PRESSURE: 108 MMHG | DIASTOLIC BLOOD PRESSURE: 52 MMHG

## 2022-11-21 VITALS — DIASTOLIC BLOOD PRESSURE: 55 MMHG | SYSTOLIC BLOOD PRESSURE: 108 MMHG

## 2022-11-21 VITALS — DIASTOLIC BLOOD PRESSURE: 51 MMHG | SYSTOLIC BLOOD PRESSURE: 98 MMHG

## 2022-11-21 DIAGNOSIS — E88.09: ICD-10-CM

## 2022-11-21 DIAGNOSIS — Z79.899: ICD-10-CM

## 2022-11-21 DIAGNOSIS — B95.2: ICD-10-CM

## 2022-11-21 DIAGNOSIS — I48.91: ICD-10-CM

## 2022-11-21 DIAGNOSIS — Z79.82: ICD-10-CM

## 2022-11-21 DIAGNOSIS — E86.0: ICD-10-CM

## 2022-11-21 DIAGNOSIS — E44.0: ICD-10-CM

## 2022-11-21 DIAGNOSIS — Z74.09: ICD-10-CM

## 2022-11-21 DIAGNOSIS — Z93.1: ICD-10-CM

## 2022-11-21 DIAGNOSIS — I10: ICD-10-CM

## 2022-11-21 DIAGNOSIS — Z20.822: ICD-10-CM

## 2022-11-21 DIAGNOSIS — D68.59: ICD-10-CM

## 2022-11-21 DIAGNOSIS — L89.90: ICD-10-CM

## 2022-11-21 DIAGNOSIS — J15.6: ICD-10-CM
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
## 2022-11-21 DIAGNOSIS — I69.398: ICD-10-CM

## 2022-11-21 DIAGNOSIS — J96.01: ICD-10-CM

## 2022-11-21 DIAGNOSIS — Z16.12: ICD-10-CM

## 2022-11-21 DIAGNOSIS — A41.9: Primary | ICD-10-CM

## 2022-11-21 DIAGNOSIS — E87.1: ICD-10-CM

## 2022-11-21 DIAGNOSIS — G93.41: ICD-10-CM

## 2022-11-21 DIAGNOSIS — N39.0: ICD-10-CM

## 2022-11-21 DIAGNOSIS — E87.6: ICD-10-CM

## 2022-11-21 DIAGNOSIS — Z79.01: ICD-10-CM

## 2022-11-21 DIAGNOSIS — G30.9: ICD-10-CM

## 2022-11-21 DIAGNOSIS — F09: ICD-10-CM

## 2022-11-21 DIAGNOSIS — E87.0: ICD-10-CM

## 2022-11-21 DIAGNOSIS — E78.5: ICD-10-CM

## 2022-11-21 DIAGNOSIS — F02.80: ICD-10-CM

## 2022-11-21 DIAGNOSIS — N17.9: ICD-10-CM

## 2022-11-21 LAB
ALBUMIN SERPL BCP-MCNC: 2.8 G/DL (ref 3.4–5)
ALP SERPL-CCNC: 52 U/L (ref 46–116)
ALT SERPL W P-5'-P-CCNC: 15 U/L (ref 12–78)
AST SERPL W P-5'-P-CCNC: 17 U/L (ref 15–37)
BASE EXCESS BLDA CALC-SCNC: -0.7 MMOL/L
BASOPHILS # BLD AUTO: 0.1 K/UL (ref 0–0.2)
BASOPHILS NFR BLD AUTO: 0.4 % (ref 0–2)
BILIRUB DIRECT SERPL-MCNC: 0.3 MG/DL (ref 0–0.2)
BILIRUB SERPL-MCNC: 0.8 MG/DL (ref 0.2–1)
BILIRUB UR QL STRIP: NEGATIVE
BUN SERPL-MCNC: 45 MG/DL (ref 7–18)
CALCIUM SERPL-MCNC: 8.9 MG/DL (ref 8.5–10.1)
CHLORIDE SERPL-SCNC: 111 MMOL/L (ref 98–107)
CO2 SERPL-SCNC: 31 MMOL/L (ref 21–32)
COLOR UR: YELLOW
CREAT SERPL-MCNC: 0.8 MG/DL (ref 0.6–1.3)
EOSINOPHIL NFR BLD AUTO: 0.1 % (ref 0–6)
GLUCOSE SERPL-MCNC: 211 MG/DL (ref 74–106)
GLUCOSE UR STRIP-MCNC: NEGATIVE MG/DL
HCT VFR BLD AUTO: 41 % (ref 33–45)
HGB BLD-MCNC: 12.4 G/DL (ref 11.5–14.8)
INHALED O2 CONCENTRATION: 40 %
LEUKOCYTE ESTERASE UR QL STRIP: NEGATIVE
LYMPHOCYTES NFR BLD AUTO: 0.7 K/UL (ref 0.8–4.8)
LYMPHOCYTES NFR BLD AUTO: 4.7 % (ref 20–44)
LYMPHOCYTES NFR BLD MANUAL: 8 % (ref 16–48)
MCHC RBC AUTO-ENTMCNC: 30 G/DL (ref 31–36)
MCV RBC AUTO: 83 FL (ref 82–100)
MONOCYTES NFR BLD AUTO: 0.6 K/UL (ref 0.1–1.3)
MONOCYTES NFR BLD AUTO: 3.9 % (ref 2–12)
MONOCYTES NFR BLD MANUAL: 2 % (ref 0–11)
NEUTROPHILS # BLD AUTO: 13.5 K/UL (ref 1.8–8.9)
NEUTROPHILS NFR BLD AUTO: 90.9 % (ref 43–81)
NEUTS BAND NFR BLD MANUAL: 4 % (ref 0–5)
NEUTS SEG NFR BLD MANUAL: 86 % (ref 42–76)
NITRITE UR QL STRIP: NEGATIVE
PCO2 TEMP ADJ BLDA: 38.9 MMHG (ref 35–45)
PH TEMP ADJ BLDA: 7.41 [PH] (ref 7.35–7.45)
PH UR STRIP: 7 [PH] (ref 5–8)
PLATELET # BLD AUTO: 248 K/UL (ref 150–450)
PO2 TEMP ADJ BLDA: 80.3 MMHG (ref 75–100)
POTASSIUM SERPL-SCNC: 5 MMOL/L (ref 3.5–5.1)
PROT SERPL-MCNC: 7.5 G/DL (ref 6.4–8.2)
PROT UR QL STRIP: (no result) MG/DL
RBC # BLD AUTO: 4.96 MIL/UL (ref 4–5.2)
RBC #/AREA URNS HPF: (no result) /HPF (ref 0–2)
SODIUM SERPL-SCNC: 151 MMOL/L (ref 136–145)
UROBILINOGEN UR STRIP-MCNC: 0.2 EU/DL
VENTILATION MODE VENT: (no result)
WBC #/AREA URNS HPF: (no result) /HPF (ref 0–3)
WBC NRBC COR # BLD AUTO: 14.9 K/UL (ref 4.3–11)

## 2022-11-21 PROCEDURE — C9113 INJ PANTOPRAZOLE SODIUM, VIA: HCPCS

## 2022-11-21 PROCEDURE — A6253 ABSORPT DRG > 48 SQ IN W/O B: HCPCS

## 2022-11-21 PROCEDURE — A4217 STERILE WATER/SALINE, 500 ML: HCPCS

## 2022-11-21 PROCEDURE — A6403 STERILE GAUZE>16 <= 48 SQ IN: HCPCS

## 2022-11-21 PROCEDURE — C9803 HOPD COVID-19 SPEC COLLECT: HCPCS

## 2022-11-21 PROCEDURE — A4629 TRACHEOSTOMY CARE KIT: HCPCS

## 2022-11-21 PROCEDURE — G0378 HOSPITAL OBSERVATION PER HR: HCPCS

## 2022-11-21 RX ADMIN — PIPERACILLIN SODIUM AND TAZOBACTAM SODIUM SCH MLS/HR: .375; 3 INJECTION, POWDER, LYOPHILIZED, FOR SOLUTION INTRAVENOUS at 11:30

## 2022-11-21 RX ADMIN — APIXABAN SCH MG: 2.5 TABLET, FILM COATED ORAL at 16:35

## 2022-11-21 RX ADMIN — SODIUM CHLORIDE PRN MLS/HR: 9 INJECTION, SOLUTION INTRAVENOUS at 10:19

## 2022-11-21 RX ADMIN — Medication SCH MG: at 20:32

## 2022-11-21 RX ADMIN — ASPIRIN 81 MG SCH MG: 81 TABLET ORAL at 10:59

## 2022-11-21 RX ADMIN — Medication PRN ML: at 16:25

## 2022-11-21 RX ADMIN — AMIODARONE HYDROCHLORIDE SCH MG: 200 TABLET ORAL at 11:02

## 2022-11-21 RX ADMIN — PIPERACILLIN SODIUM AND TAZOBACTAM SODIUM SCH MLS/HR: .375; 3 INJECTION, POWDER, LYOPHILIZED, FOR SOLUTION INTRAVENOUS at 20:36

## 2022-11-21 RX ADMIN — DONEPEZIL HYDROCHLORIDE SCH MG: 5 TABLET ORAL at 10:59

## 2022-11-21 RX ADMIN — METOPROLOL TARTRATE SCH MG: 25 TABLET, FILM COATED ORAL at 12:07

## 2022-11-21 RX ADMIN — METOPROLOL TARTRATE SCH MG: 25 TABLET, FILM COATED ORAL at 16:31

## 2022-11-21 RX ADMIN — APIXABAN SCH MG: 2.5 TABLET, FILM COATED ORAL at 11:00

## 2022-11-21 RX ADMIN — METOPROLOL TARTRATE SCH MG: 25 TABLET, FILM COATED ORAL at 09:00

## 2022-11-21 RX ADMIN — Medication SCH MG: at 15:30

## 2022-11-21 NOTE — NUR
TELE RN OPENING NOTE

PT RECEIVED IN BED, NON-VERBAL. PT ON 6L NC WITH CURRENT O2SAT OF 96%; PT NOTED TO HAVE 
CONGESTION AND PRODUCTIVE COUGH; NO OTHER S/S OF RESP DISTRESS, NO SOB, NON-LABORED AND 
EQUAL BREATHING. PT ATTACHED TO EXTERNAL MONITOR, ST WITH HR . GTD C/D/I WITH GLUCERNA 
AT 60 ML/HR; NO RESIDUAL NOTED. MIKE MIDLINE AND LAC 18G INTACT AND PATENT, NS INFUSING AT 80 
ML/HR. SMITH INTACT AND PATENT, DRAINING CLEAR AND YELLOW URINE. BED IN LOWEST POSITION, 
CALL LIGHT WITHIN REACH, SIDE RAILS UP X3. WILL CONTINUE TO MONITOR THROUGHOUT THE NIGHT.

## 2022-11-21 NOTE — NUR
RN CLOSING NOTE 



PT NONVERBAL. PT IN BED. HOB ELEVATED. PT ON 6 L NASAL CANNULA AT 98%, TELE MONITOR SINUS 
TACHYCARDIA. PT HAS SKIN TEARS THROUGHOUT BODY. IV ACCESS. PATENT AND INACT, FLUSHING WELL. 
GTUBE INTACT, PATENT.NO RESIDUAL VOLUME NOTED. FC DRAINING YELLOW COLOR TO GRAVITY. ALL 
SAFETY MEASURES IN PLACE.CALL LIGHT WITHIN REACH. BED LOCKED AT LOWEST POSITION. SIDE RAILS 
UP X2. BED ALARM ON. ENDORSED TO NIGHT SHIFT RN FOR CONTUNITY OF CARE.

## 2022-11-21 NOTE — NUR
[id number] FROM [facility] FOR SOB AND AMS, O2 SAT MID 80S ROOM AIR, PATIENT HAS 
PNEUMONIA 7 DAYS AGO AND WAS TREATED WITH ANTIBIOTICS. PATIENT IS AAOX0. CAME 
WITH NRB AT 10LPM SATURATING 98%. PATIENT IS ATTACHED TO MONITOR. FEBRILE. 
VITALS CHECKED.

## 2022-11-21 NOTE — NUR
RN OPENING NOTE



PT ALERT AND ORIENTED X0. PT IS NONVERBAL. PT ON 6L NASAL CANNULA TOLERATING AT 95%. PT IS 
ON TELE MONITOR CURRENTLY SINUS TACHYCARDIA. PT HAS GENERALIZED SKIN TEARS THROUGHOUT BODY. 
PT IS ON TUBE FEEDING.GTUBE INTACT, PATENT. NO RESIDUAL VOLUME NOTED. PT HAS SMITH CATHETER. 
YELLOW COLOR DRAINING TO GRAVITY. PT HAS IV ACCESS ON L AND RIGHT ARM. IV PATENT,INTACT AND 
FLUSHING WELL. PATIENT IS CURRENTLY AFEBRILE. ALL SAFETY PRECAUTIONS IN PLACE. CALL LIGHT 
WITHIN REAHCH.BED LOCKED AT LOWEST POSITION. SIDE RAILS UP X2. BED ALARM ON.

## 2022-11-22 VITALS — SYSTOLIC BLOOD PRESSURE: 117 MMHG | DIASTOLIC BLOOD PRESSURE: 58 MMHG

## 2022-11-22 VITALS — DIASTOLIC BLOOD PRESSURE: 48 MMHG | SYSTOLIC BLOOD PRESSURE: 98 MMHG

## 2022-11-22 VITALS — DIASTOLIC BLOOD PRESSURE: 50 MMHG | SYSTOLIC BLOOD PRESSURE: 115 MMHG

## 2022-11-22 VITALS — SYSTOLIC BLOOD PRESSURE: 100 MMHG | DIASTOLIC BLOOD PRESSURE: 58 MMHG

## 2022-11-22 VITALS — SYSTOLIC BLOOD PRESSURE: 115 MMHG | DIASTOLIC BLOOD PRESSURE: 46 MMHG

## 2022-11-22 VITALS — SYSTOLIC BLOOD PRESSURE: 101 MMHG | DIASTOLIC BLOOD PRESSURE: 53 MMHG

## 2022-11-22 LAB
BASOPHILS # BLD AUTO: 0 K/UL (ref 0–0.2)
BASOPHILS NFR BLD AUTO: 0.1 % (ref 0–2)
BUN SERPL-MCNC: 33 MG/DL (ref 7–18)
CALCIUM SERPL-MCNC: 8.3 MG/DL (ref 8.5–10.1)
CHLORIDE SERPL-SCNC: 112 MMOL/L (ref 98–107)
CO2 SERPL-SCNC: 29 MMOL/L (ref 21–32)
CREAT SERPL-MCNC: 0.6 MG/DL (ref 0.6–1.3)
EOSINOPHIL NFR BLD AUTO: 0.5 % (ref 0–6)
GLUCOSE SERPL-MCNC: 175 MG/DL (ref 74–106)
HCT VFR BLD AUTO: 29 % (ref 33–45)
HGB BLD-MCNC: 9.2 G/DL (ref 11.5–14.8)
LYMPHOCYTES NFR BLD AUTO: 0.5 K/UL (ref 0.8–4.8)
LYMPHOCYTES NFR BLD AUTO: 5.2 % (ref 20–44)
MCHC RBC AUTO-ENTMCNC: 31 G/DL (ref 31–36)
MCV RBC AUTO: 82 FL (ref 82–100)
MONOCYTES NFR BLD AUTO: 0.4 K/UL (ref 0.1–1.3)
MONOCYTES NFR BLD AUTO: 3.9 % (ref 2–12)
NEUTROPHILS # BLD AUTO: 8.1 K/UL (ref 1.8–8.9)
NEUTROPHILS NFR BLD AUTO: 90.3 % (ref 43–81)
PLATELET # BLD AUTO: 141 K/UL (ref 150–450)
POTASSIUM SERPL-SCNC: 2.6 MMOL/L (ref 3.5–5.1)
RBC # BLD AUTO: 3.6 MIL/UL (ref 4–5.2)
SODIUM SERPL-SCNC: 147 MMOL/L (ref 136–145)
WBC NRBC COR # BLD AUTO: 9 K/UL (ref 4.3–11)

## 2022-11-22 RX ADMIN — SODIUM CHLORIDE, SODIUM LACTATE, POTASSIUM CHLORIDE, AND CALCIUM CHLORIDE SCH MLS/HR: .6; .31; .03; .02 INJECTION, SOLUTION INTRAVENOUS at 18:04

## 2022-11-22 RX ADMIN — METOPROLOL TARTRATE SCH MG: 25 TABLET, FILM COATED ORAL at 13:06

## 2022-11-22 RX ADMIN — SODIUM CHLORIDE PRN MLS/HR: 9 INJECTION, SOLUTION INTRAVENOUS at 06:02

## 2022-11-22 RX ADMIN — DONEPEZIL HYDROCHLORIDE SCH MG: 5 TABLET ORAL at 08:59

## 2022-11-22 RX ADMIN — Medication SCH MG: at 20:17

## 2022-11-22 RX ADMIN — Medication SCH MG: at 08:57

## 2022-11-22 RX ADMIN — PIPERACILLIN SODIUM AND TAZOBACTAM SODIUM SCH MLS/HR: .375; 3 INJECTION, POWDER, LYOPHILIZED, FOR SOLUTION INTRAVENOUS at 03:36

## 2022-11-22 RX ADMIN — Medication SCH MG: at 00:20

## 2022-11-22 RX ADMIN — DEXTROSE MONOHYDRATE PRN MLS/HR: 50 INJECTION, SOLUTION INTRAVENOUS at 05:01

## 2022-11-22 RX ADMIN — Medication PRN ML: at 22:12

## 2022-11-22 RX ADMIN — SODIUM CHLORIDE, SODIUM LACTATE, POTASSIUM CHLORIDE, AND CALCIUM CHLORIDE SCH MLS/HR: .6; .31; .03; .02 INJECTION, SOLUTION INTRAVENOUS at 16:48

## 2022-11-22 RX ADMIN — APIXABAN SCH MG: 2.5 TABLET, FILM COATED ORAL at 17:50

## 2022-11-22 RX ADMIN — METOPROLOL TARTRATE SCH MG: 25 TABLET, FILM COATED ORAL at 17:43

## 2022-11-22 RX ADMIN — PIPERACILLIN SODIUM AND TAZOBACTAM SODIUM SCH MLS/HR: .375; 3 INJECTION, POWDER, LYOPHILIZED, FOR SOLUTION INTRAVENOUS at 12:22

## 2022-11-22 RX ADMIN — PIPERACILLIN SODIUM AND TAZOBACTAM SODIUM SCH MLS/HR: .375; 3 INJECTION, POWDER, LYOPHILIZED, FOR SOLUTION INTRAVENOUS at 20:40

## 2022-11-22 RX ADMIN — METOPROLOL TARTRATE SCH MG: 25 TABLET, FILM COATED ORAL at 09:00

## 2022-11-22 RX ADMIN — Medication SCH MG: at 03:54

## 2022-11-22 RX ADMIN — Medication SCH MG: at 14:36

## 2022-11-22 RX ADMIN — APIXABAN SCH MG: 2.5 TABLET, FILM COATED ORAL at 09:05

## 2022-11-22 RX ADMIN — DEXTROSE MONOHYDRATE PRN MLS/HR: 50 INJECTION, SOLUTION INTRAVENOUS at 11:26

## 2022-11-22 RX ADMIN — ASPIRIN 81 MG SCH MG: 81 TABLET ORAL at 08:59

## 2022-11-22 RX ADMIN — Medication SCH MG: at 11:26

## 2022-11-22 RX ADMIN — AMIODARONE HYDROCHLORIDE SCH MG: 200 TABLET ORAL at 09:00

## 2022-11-22 RX ADMIN — PANTOPRAZOLE SODIUM SCH MG: 40 GRANULE, DELAYED RELEASE ORAL at 09:14

## 2022-11-22 NOTE — NUR
TELE RN CLOSING NOTE

PT REMAINS IN BED, NON-VERBAL. RT TITRATED NC FROM 6L TO 5L WITH O2SAT RANGING FROM 94%- 
96%;CONTINUES TO HAVE CONGESTION AND PRODUCTIVE COUGH WITH LARGE AMOUNT OF SECRETIONS; PT 
SUCTIONED APPROPRIATELY THROUGHOUT SHIFT; NO OTHER S/S OF RESP DISTRESS, NO SOB, NON-LABORED 
AND EQUAL BREATHING. PT ATTACHED TO EXTERNAL MONITOR, ST WITH HR . GTD C/D/I WITH 
GLUCERNA AT 60 ML/HR; NO RESIDUAL NOTED. MIKE MIDLINE AND LAC 18G INTACT AND PATENT, 
AMIODARONE INFUSING AT 33.3 ML/HR AND NS INFUSING AT 80 ML/HR. SMITH INTACT AND PATENT, 
DRAINING CLEAR AND YELLOW URINE. BED IN LOWEST POSITION, CALL LIGHT WITHIN REACH, SIDE RAILS 
UP X3. WILL ENDORSE TO DAYSHIFT NURSE TO CONTINUE CARE.

## 2022-11-22 NOTE — NUR
RN CLOSING NOTE



PT REMAINS IN BED, NON-VERBAL. RT TITRATED NC FROM 5L TO 4L WITH O2SAT RANGING FROM 94%- 
98%. PATIENT HAD PRODUCTIVE COUGH WITH LARGE AMOUNT OF SECRETIONS DURING THE SHIFT, 
SUCTIONING AND BREATHING TREATMENT ON TIME; NO OTHER S/S OF RESP DISTRESS, NO SOB, 
NON-LABORED AND EQUAL BREATHING. PT ATTACHED TO EXTERNAL MONITOR, HEART RATE IN THE MORNING 
TILL 1500 WAS AROUND 101-109 AFTERNOON AROUND 1600 TO 1830 71-80. G TUBE GLUCERNA AT 60 
ML/HR TURNED OFF AT 1225 AND TURNED ON AT 1625 NO RESIDUAL NOTED. LAC 18G INTACT AND PATENT, 
AT 1126 AMIODARONE TITRATED FROM 33.3 ML/HR TO 16.6, CRITICAL LAB VALUE WAS POTASSIUM 2.6 MD 
ORDERED POTASSIUM 20 MEQ IN 1000 ML 0.9% NS RUNNING AT THE RAT EOF 80ML/HR. SMITH INTACT AND 
PATENT, DRAINING CLEAR AND YELLOW URINE. BED IN LOWEST POSITION, CALL LIGHT WITHIN REACH, 
SIDE RAILS UP X3. WILL ENDORSE TO NIGHT SHIFT NURSE TO CONTINUE OF CARE.

## 2022-11-22 NOTE — NUR
RN NOTE

PATIENT ALREADY ON AMIODARONE DRIP, WE HELD THE AMIODARONE TABLET. DR. FISCHER NOTIFIED.



METOPROLOL HELD DUE TO LOWE BLOOD PRESSURE 98/48. ALSO DR. FISCHER NOTIFIED 





PROTONIC IV DC, I GAVE THE PACKET PROTONIX TO PATIENT

## 2022-11-22 NOTE — NUR
RN NOTE,



PT IN BED AWAKE A/O TO SELF, NONVERBAL, AT  4L WITH O2 SAT WITH O2 >94%  STILL WITH 
PRODUCTIVE COUGH WITH LARGE AMOUNT OF SECRETIONS,  WILL SUCTION AS NEEDED, NO SOB/ACUTE 
DISTRESS NOTED, NSR IN TELE MONITOR WITH HR 70S AT THIS TIME, PATIENT CONTINUE ON AMIODARONE 
DRIP INFUSING AT 0.5MG AT THIS TIME TILL THE REST OF THE 24HRS THERAPY,   LAC 18G INTACT AND 
PATENT,ON  POTASSIUM 20 MEQ IN 1000 ML 0.9% NS RUNNING AT  80ML/HR,  SMITH  PATENT DRAINING 
CLEAR AND YELLOW URINE BY GRAVITY, HOB ELEVATED TO 40 DEGREES FOR ASPIRATION PRECAUTIONS,   
IN LOWEST POSITION, CALL LIGHT WITHIN REACH, SIDE RAILS UP X3, WILL CONTINUE TO MONITOR 
CLOSELY.

## 2022-11-23 VITALS — DIASTOLIC BLOOD PRESSURE: 54 MMHG | SYSTOLIC BLOOD PRESSURE: 120 MMHG

## 2022-11-23 VITALS — SYSTOLIC BLOOD PRESSURE: 122 MMHG | DIASTOLIC BLOOD PRESSURE: 53 MMHG

## 2022-11-23 VITALS — DIASTOLIC BLOOD PRESSURE: 63 MMHG | SYSTOLIC BLOOD PRESSURE: 127 MMHG

## 2022-11-23 VITALS — DIASTOLIC BLOOD PRESSURE: 58 MMHG | SYSTOLIC BLOOD PRESSURE: 104 MMHG

## 2022-11-23 VITALS — DIASTOLIC BLOOD PRESSURE: 61 MMHG | SYSTOLIC BLOOD PRESSURE: 108 MMHG

## 2022-11-23 VITALS — DIASTOLIC BLOOD PRESSURE: 57 MMHG | SYSTOLIC BLOOD PRESSURE: 115 MMHG

## 2022-11-23 LAB
BUN SERPL-MCNC: 30 MG/DL (ref 7–18)
CALCIUM SERPL-MCNC: 8.2 MG/DL (ref 8.5–10.1)
CHLORIDE SERPL-SCNC: 113 MMOL/L (ref 98–107)
CO2 SERPL-SCNC: 29 MMOL/L (ref 21–32)
CREAT SERPL-MCNC: 0.5 MG/DL (ref 0.6–1.3)
GLUCOSE SERPL-MCNC: 145 MG/DL (ref 74–106)
POTASSIUM SERPL-SCNC: 4.2 MMOL/L (ref 3.5–5.1)
SODIUM SERPL-SCNC: 149 MMOL/L (ref 136–145)

## 2022-11-23 RX ADMIN — AMIODARONE HYDROCHLORIDE SCH MG: 200 TABLET ORAL at 08:13

## 2022-11-23 RX ADMIN — PIPERACILLIN SODIUM AND TAZOBACTAM SODIUM SCH MLS/HR: .375; 3 INJECTION, POWDER, LYOPHILIZED, FOR SOLUTION INTRAVENOUS at 20:14

## 2022-11-23 RX ADMIN — Medication SCH MG: at 11:21

## 2022-11-23 RX ADMIN — Medication PRN ML: at 17:50

## 2022-11-23 RX ADMIN — DONEPEZIL HYDROCHLORIDE SCH MG: 5 TABLET ORAL at 08:09

## 2022-11-23 RX ADMIN — Medication SCH MG: at 08:28

## 2022-11-23 RX ADMIN — APIXABAN SCH MG: 2.5 TABLET, FILM COATED ORAL at 08:11

## 2022-11-23 RX ADMIN — SODIUM CHLORIDE, SODIUM LACTATE, POTASSIUM CHLORIDE, AND CALCIUM CHLORIDE SCH MLS/HR: .6; .31; .03; .02 INJECTION, SOLUTION INTRAVENOUS at 05:24

## 2022-11-23 RX ADMIN — Medication SCH MG: at 19:52

## 2022-11-23 RX ADMIN — Medication SCH MG: at 16:54

## 2022-11-23 RX ADMIN — Medication SCH MG: at 00:15

## 2022-11-23 RX ADMIN — SODIUM CHLORIDE, SODIUM LACTATE, POTASSIUM CHLORIDE, AND CALCIUM CHLORIDE SCH MLS/HR: .6; .31; .03; .02 INJECTION, SOLUTION INTRAVENOUS at 17:48

## 2022-11-23 RX ADMIN — PIPERACILLIN SODIUM AND TAZOBACTAM SODIUM SCH MLS/HR: .375; 3 INJECTION, POWDER, LYOPHILIZED, FOR SOLUTION INTRAVENOUS at 04:03

## 2022-11-23 RX ADMIN — DEXTROSE MONOHYDRATE SCH MLS/HR: 50 INJECTION, SOLUTION INTRAVENOUS at 08:46

## 2022-11-23 RX ADMIN — Medication SCH MG: at 03:53

## 2022-11-23 RX ADMIN — METOPROLOL TARTRATE SCH MG: 25 TABLET, FILM COATED ORAL at 13:47

## 2022-11-23 RX ADMIN — ASPIRIN 81 MG SCH MG: 81 TABLET ORAL at 08:09

## 2022-11-23 RX ADMIN — METOPROLOL TARTRATE SCH MG: 25 TABLET, FILM COATED ORAL at 08:09

## 2022-11-23 RX ADMIN — APIXABAN SCH MG: 2.5 TABLET, FILM COATED ORAL at 17:06

## 2022-11-23 RX ADMIN — DEXTROSE MONOHYDRATE PRN MLS/HR: 50 INJECTION, SOLUTION INTRAVENOUS at 01:19

## 2022-11-23 RX ADMIN — METOPROLOL TARTRATE SCH MG: 25 TABLET, FILM COATED ORAL at 17:06

## 2022-11-23 RX ADMIN — PANTOPRAZOLE SODIUM SCH MG: 40 GRANULE, DELAYED RELEASE ORAL at 08:09

## 2022-11-23 RX ADMIN — Medication SCH MG: at 23:30

## 2022-11-23 RX ADMIN — PIPERACILLIN SODIUM AND TAZOBACTAM SODIUM SCH MLS/HR: .375; 3 INJECTION, POWDER, LYOPHILIZED, FOR SOLUTION INTRAVENOUS at 12:15

## 2022-11-23 NOTE — NUR
RN NOTES



RECEIVED PT FOR CONTINUITY OF CARE. PATIENT A/OX1 IN NO S/SX OF ACUTE DISTRESS AT THIS TIME; 
CURRENTLY ON 2L OF 02 VIA NC; WITH 02 SAT >95% AT THIS TIME.WITH IV ACCESS PATENT, INTACT 
AND FLUSHING WELL. WITH RUNNING 20MEQ KCL IN 1L NS@80CC/HR. WITH GTUBE FEEDING RUNNING AS 
ORDERED. WILL ENSURE SAFETY MEASURES WITHIN THE SHIFT. PATIENT BED ALARM IS ON. HEAD OF BED 
ELEVATED. BED IS LOCKED,  IN LOWEST POSITION AND SIDE RAILS UP. CALL LIGHT WITHIN REACH OF 
THE PATIENT. WILL CONTINUE TO MONITOR AND REASSESS FOR ANY CHANGES AND WILL CARRY OUT ANY 
ONGOING AND ACTIVE MD ORDER.

## 2022-11-23 NOTE — NUR
RN NOTE,



PT IN BED ASLEEP  NONVERBAL, AT  4L WITH O2 SAT WITH O2 >94%  STILL WITH PRODUCTIVE COUGH 
WITH LARGE AMOUNT OF SECRETIONS, SUCTIONED MULTIPLE TIMES DURING THE NIGHT,   NO SOB/ACUTE 
DISTRESS NOTED, NSR IN TELE MONITOR WITH HR 70S-80S DURING THE NIGHT, AMIODARONE DRIP 
STOPPED AT 0501, STABLE HR,   POTASSIUM 20 MEQ IN 1000 ML 0.9% NS RUNNING AT  80ML/HR,  
SMITH  PATENT DRAINING CLEAR AND YELLOW URINE BY GRAVITY, HOB ELEVATED TO 40 DEGREES FOR 
ASPIRATION PRECAUTIONS,  OTHERWISE NO SIGNIFICANT CHANGE IN CONDITION, BED LOCKED AND  
LOWEST POSITION, CALL LIGHT WITHIN REACH, SIDE RAILS UP X3, WILL ENDORSE CONTINUITY OF CARE 
TO ONCOMING NURSE.

## 2022-11-23 NOTE — NUR
RN NOTE



PATIENT REMAINS CONFUSED NON VERBAL ON 3L OXYGEN VIA NASAL CANNULA,O2:98% IV ON LEFT HAND 
INTACT PATENT,ON G-TUBE FEEDING ALL DUE MEDS GIVEN AS MD ORDERED KEPT CLEAN AND DRY ALL THE 
TIME,HEAD OF THE BED ELEVATED ALL THE TIME,TURNED AND REPOSITIONED EVERY 2 HOURS,SMITH CATH 
IN PLACE,WILL ENDORSE NEXT COMING SHIFT FOR CONTINUATION OF CARE.

## 2022-11-23 NOTE — NUR
RN NOTE



RECEIVED PATIENT IN BED RESTING,SLEEPING NON VERBAL,ON 4L OXYGEN VIA NASAL CANNULA,O2:99% IV 
SITE IS ON LEFT AC AND LEFT HAND INTACT PATENT,ON IV HYDRATION NS PLUS POTASSIUM 20MEQ,AT 80 
CC/HR ON G-TUBE FEEDING DIABETIC RESOURCES 60CC/HR CHECKED PLACEMENT IN PLACE NO RESIDUAL 
NOTED,SMITH CATH IN PLACE,URINE DRAINING YELLOW BY GRAVITY,SAFETY MEASURE IMPLEMENT BED IN 
LOW POSITION AND LOCKED,HEAD OF THE BED ELEVATED,CONTINUE TO MONITOR.

## 2022-11-23 NOTE — NUR
WOUND CARE CONSULT: PT PRESENTS WITH MULTIPLE DEEP TISSUE INJURIES INCLUDING 
SACRUM,BILATERAL HIPS AND RT LATERAL KNEE, ALL PRESENT ON ADMISSION. DR DIANE CALLED 
FOR SURGICAL CONSULT. RECOMMENDATIONS MADE FOR SKIN PROTECTION AND WOUND CARE. DISCUSSED 
WITH NURSING STAFF FIRST STEP LOW AIRLOSS MATTRESS IS ON ORDER. MD IN AGREEMENT WITH PLAN OF 
CARE. LUIS NEELY NOTED. 

-------------------------------------------------------------------------------

Addendum: 11/23/22 at 1251 by AFSANEH MARQUEZ WNDNU

-------------------------------------------------------------------------------

Amended: Links added.

## 2022-11-23 NOTE — NUR
AMIODARONE DRIP STOPPED AT THIS TIME, PATIENT TOLERATED WELL, WITH HR 80S AT THIS TIME, ANS 
STABLE BLOOD PRESSURE, WILL CONTINUE TO MONITOR CLOSELY.

## 2022-11-24 VITALS — DIASTOLIC BLOOD PRESSURE: 82 MMHG | SYSTOLIC BLOOD PRESSURE: 135 MMHG

## 2022-11-24 VITALS — SYSTOLIC BLOOD PRESSURE: 129 MMHG | DIASTOLIC BLOOD PRESSURE: 64 MMHG

## 2022-11-24 VITALS — DIASTOLIC BLOOD PRESSURE: 64 MMHG | SYSTOLIC BLOOD PRESSURE: 124 MMHG

## 2022-11-24 VITALS — SYSTOLIC BLOOD PRESSURE: 126 MMHG | DIASTOLIC BLOOD PRESSURE: 64 MMHG

## 2022-11-24 VITALS — DIASTOLIC BLOOD PRESSURE: 59 MMHG | SYSTOLIC BLOOD PRESSURE: 110 MMHG

## 2022-11-24 VITALS — SYSTOLIC BLOOD PRESSURE: 129 MMHG | DIASTOLIC BLOOD PRESSURE: 65 MMHG

## 2022-11-24 LAB
ALBUMIN SERPL BCP-MCNC: 1.8 G/DL (ref 3.4–5)
ALP SERPL-CCNC: 63 U/L (ref 46–116)
ALT SERPL W P-5'-P-CCNC: 14 U/L (ref 12–78)
AST SERPL W P-5'-P-CCNC: 16 U/L (ref 15–37)
BASOPHILS # BLD AUTO: 0 K/UL (ref 0–0.2)
BASOPHILS NFR BLD AUTO: 0.2 % (ref 0–2)
BILIRUB SERPL-MCNC: 0.4 MG/DL (ref 0.2–1)
BUN SERPL-MCNC: 25 MG/DL (ref 7–18)
CALCIUM SERPL-MCNC: 8.2 MG/DL (ref 8.5–10.1)
CHLORIDE SERPL-SCNC: 112 MMOL/L (ref 98–107)
CO2 SERPL-SCNC: 29 MMOL/L (ref 21–32)
CREAT SERPL-MCNC: 0.5 MG/DL (ref 0.6–1.3)
EOSINOPHIL NFR BLD AUTO: 2.8 % (ref 0–6)
GLUCOSE SERPL-MCNC: 117 MG/DL (ref 74–106)
HCT VFR BLD AUTO: 33 % (ref 33–45)
HGB BLD-MCNC: 9.8 G/DL (ref 11.5–14.8)
LYMPHOCYTES NFR BLD AUTO: 0.6 K/UL (ref 0.8–4.8)
LYMPHOCYTES NFR BLD AUTO: 9.3 % (ref 20–44)
MCHC RBC AUTO-ENTMCNC: 30 G/DL (ref 31–36)
MCV RBC AUTO: 84 FL (ref 82–100)
MONOCYTES NFR BLD AUTO: 0.3 K/UL (ref 0.1–1.3)
MONOCYTES NFR BLD AUTO: 3.9 % (ref 2–12)
NEUTROPHILS # BLD AUTO: 5.8 K/UL (ref 1.8–8.9)
NEUTROPHILS NFR BLD AUTO: 83.8 % (ref 43–81)
PLATELET # BLD AUTO: 128 K/UL (ref 150–450)
POTASSIUM SERPL-SCNC: 4.3 MMOL/L (ref 3.5–5.1)
PROT SERPL-MCNC: 5.6 G/DL (ref 6.4–8.2)
RBC # BLD AUTO: 3.89 MIL/UL (ref 4–5.2)
SODIUM SERPL-SCNC: 144 MMOL/L (ref 136–145)
WBC NRBC COR # BLD AUTO: 6.9 K/UL (ref 4.3–11)

## 2022-11-24 RX ADMIN — AMIODARONE HYDROCHLORIDE SCH MG: 200 TABLET ORAL at 08:05

## 2022-11-24 RX ADMIN — DONEPEZIL HYDROCHLORIDE SCH MG: 5 TABLET ORAL at 08:05

## 2022-11-24 RX ADMIN — Medication SCH MG: at 07:35

## 2022-11-24 RX ADMIN — METOPROLOL TARTRATE SCH MG: 25 TABLET, FILM COATED ORAL at 12:16

## 2022-11-24 RX ADMIN — METOPROLOL TARTRATE SCH MG: 25 TABLET, FILM COATED ORAL at 08:06

## 2022-11-24 RX ADMIN — DEXTROSE MONOHYDRATE SCH MLS/HR: 50 INJECTION, SOLUTION INTRAVENOUS at 12:03

## 2022-11-24 RX ADMIN — Medication SCH MG: at 11:30

## 2022-11-24 RX ADMIN — Medication SCH MG: at 15:30

## 2022-11-24 RX ADMIN — PANTOPRAZOLE SODIUM SCH MG: 40 GRANULE, DELAYED RELEASE ORAL at 08:05

## 2022-11-24 RX ADMIN — APIXABAN SCH MG: 2.5 TABLET, FILM COATED ORAL at 08:07

## 2022-11-24 RX ADMIN — Medication SCH MG: at 03:34

## 2022-11-24 RX ADMIN — METOPROLOL TARTRATE SCH MG: 25 TABLET, FILM COATED ORAL at 16:27

## 2022-11-24 RX ADMIN — Medication SCH MG: at 19:59

## 2022-11-24 RX ADMIN — APIXABAN SCH MG: 2.5 TABLET, FILM COATED ORAL at 16:28

## 2022-11-24 RX ADMIN — Medication SCH MG: at 23:29

## 2022-11-24 RX ADMIN — ASPIRIN 81 MG SCH MG: 81 TABLET ORAL at 08:05

## 2022-11-24 RX ADMIN — PIPERACILLIN SODIUM AND TAZOBACTAM SODIUM SCH MLS/HR: .375; 3 INJECTION, POWDER, LYOPHILIZED, FOR SOLUTION INTRAVENOUS at 20:05

## 2022-11-24 RX ADMIN — Medication PRN ML: at 15:21

## 2022-11-24 RX ADMIN — PIPERACILLIN SODIUM AND TAZOBACTAM SODIUM SCH MLS/HR: .375; 3 INJECTION, POWDER, LYOPHILIZED, FOR SOLUTION INTRAVENOUS at 03:08

## 2022-11-24 RX ADMIN — PIPERACILLIN SODIUM AND TAZOBACTAM SODIUM SCH MLS/HR: .375; 3 INJECTION, POWDER, LYOPHILIZED, FOR SOLUTION INTRAVENOUS at 12:13

## 2022-11-24 NOTE — NUR
RN CLOSING NOTE: 



PATIENT REMAINS IN ROOM IN NO SIGNS OF RESPIRATORY DISTRESS, PATIENT STILL NOWON 2L OF O2 
VIA NC; TOLERATING WELL SATURATING @ >95% SP02. SAFETY MEASURES IMPLEMENTED, BED IN LOWEST 
POSITION, LOCKED, SIDE RAILS UP, CALL LIGHT WITHIN REACH. ALL NEEDS AND ORDERS ADDRESSED 
DURING THE SHIFT. IV ACCESS MAINTAINED INTACT, SECURED AND FLUSHING WELL. IV FLUID RUNNING 
PER ORDER AND TUBE FEEDING RUNNING PER ORDER. ALL DUE MEDS GIVEN AS ORDERED & SCHEDULED ; 
PATIENT TOLERATED WELL. PATIENT KEPT CLEAN AND COMFORTABLE WITHIN THE SHIFT. PATIENT 
ENDORSED TO INCOMING SHIFT RN WITH STABLE VITAL SIGN AND FOR CONTINUITY OF CARE.

## 2022-11-24 NOTE — NUR
RN NOTES



RECEIVED PT FOR CONTINUITY OF CARE. PATIENT A/OX1 IN NO S/SX OF ACUTE DISTRESS AT THIS TIME; 
CURRENTLY ON 2L OF 02 VIA NC; WITH 02 SAT >95% AT THIS TIME.WITH IV ACCESS ON L HAND #20 
PATENT, INTACT AND FLUSHING WELL. WITH GTUBE FEEDING RUNNING AS ORDERED. WILL ENSURE SAFETY 
MEASURES WITHIN THE SHIFT. PATIENT BED ALARM IS ON. HEAD OF BED ELEVATED. BED IS LOCKED,  IN 
LOWEST POSITION AND SIDE RAILS UP. CALL LIGHT WITHIN REACH OF THE PATIENT. WILL CONTINUE TO 
MONITOR AND REASSESS FOR ANY CHANGES AND WILL CARRY OUT ANY ONGOING AND ACTIVE MD ORDER.

## 2022-11-24 NOTE — NUR
RN NOTE



PT RESTING IN BED, AWAKE AND RESPONSIVE. CONTINUES IN O2 VIA NC @2L, NOT IN RESPI DISTRESS. 
WITH IV ACCESS ON L HAND G20 WITH NO IVF RUNNING.  GT IN PLACE WITH FEEDING GLUCERNA 1.2 
@80/HR. ASPIRATION PREC MAINTAINED. SAFETY MEASURES FOLLOWED. DUE MEDICATIONS GIVEN, AM/PM 
CARE DONE. WILL CONTINUE TO MONITOR.

## 2022-11-24 NOTE — NUR
RN NOTE



PT RECEIVED IN BED, AWAKE AND RESPONSIVE. CONTINUES IN O2 VIA NC @2L, NOT IN RESPI DISTRESS. 
WITH IV ACCESS ON L HAND G20 WITH [id number] RUNNING.  GT IN PLACE WITH FEEDING DIABETIC 
SOURCE @60/HR. ASPIRATION PREC MAINTAINED. SAFETY MEASURES FOLLOWED. WILL CONTINUE TO 
MONITOR.

## 2022-11-25 VITALS — DIASTOLIC BLOOD PRESSURE: 74 MMHG | SYSTOLIC BLOOD PRESSURE: 146 MMHG

## 2022-11-25 VITALS — SYSTOLIC BLOOD PRESSURE: 158 MMHG | DIASTOLIC BLOOD PRESSURE: 79 MMHG

## 2022-11-25 VITALS — DIASTOLIC BLOOD PRESSURE: 73 MMHG | SYSTOLIC BLOOD PRESSURE: 115 MMHG

## 2022-11-25 VITALS — DIASTOLIC BLOOD PRESSURE: 68 MMHG | SYSTOLIC BLOOD PRESSURE: 128 MMHG

## 2022-11-25 VITALS — SYSTOLIC BLOOD PRESSURE: 127 MMHG | DIASTOLIC BLOOD PRESSURE: 69 MMHG

## 2022-11-25 VITALS — DIASTOLIC BLOOD PRESSURE: 71 MMHG | SYSTOLIC BLOOD PRESSURE: 148 MMHG

## 2022-11-25 LAB
ALBUMIN SERPL BCP-MCNC: 1.8 G/DL (ref 3.4–5)
ALP SERPL-CCNC: 61 U/L (ref 46–116)
ALT SERPL W P-5'-P-CCNC: 11 U/L (ref 12–78)
AST SERPL W P-5'-P-CCNC: 14 U/L (ref 15–37)
BILIRUB SERPL-MCNC: 0.4 MG/DL (ref 0.2–1)
BUN SERPL-MCNC: 22 MG/DL (ref 7–18)
CALCIUM SERPL-MCNC: 8.4 MG/DL (ref 8.5–10.1)
CHLORIDE SERPL-SCNC: 106 MMOL/L (ref 98–107)
CO2 SERPL-SCNC: 31 MMOL/L (ref 21–32)
CREAT SERPL-MCNC: 0.5 MG/DL (ref 0.6–1.3)
GLUCOSE SERPL-MCNC: 128 MG/DL (ref 74–106)
POTASSIUM SERPL-SCNC: 4.3 MMOL/L (ref 3.5–5.1)
PROT SERPL-MCNC: 5.4 G/DL (ref 6.4–8.2)
SODIUM SERPL-SCNC: 139 MMOL/L (ref 136–145)

## 2022-11-25 RX ADMIN — Medication SCH MG: at 19:38

## 2022-11-25 RX ADMIN — APIXABAN SCH MG: 2.5 TABLET, FILM COATED ORAL at 16:50

## 2022-11-25 RX ADMIN — Medication SCH MG: at 15:57

## 2022-11-25 RX ADMIN — METOPROLOL TARTRATE SCH MG: 25 TABLET, FILM COATED ORAL at 16:49

## 2022-11-25 RX ADMIN — LINEZOLID SCH MG: 600 TABLET, FILM COATED ORAL at 13:13

## 2022-11-25 RX ADMIN — PANTOPRAZOLE SODIUM SCH MG: 40 GRANULE, DELAYED RELEASE ORAL at 08:15

## 2022-11-25 RX ADMIN — AMIODARONE HYDROCHLORIDE SCH MG: 200 TABLET ORAL at 08:16

## 2022-11-25 RX ADMIN — DONEPEZIL HYDROCHLORIDE SCH MG: 5 TABLET ORAL at 08:16

## 2022-11-25 RX ADMIN — Medication SCH MG: at 07:48

## 2022-11-25 RX ADMIN — PIPERACILLIN SODIUM AND TAZOBACTAM SODIUM SCH MLS/HR: .375; 3 INJECTION, POWDER, LYOPHILIZED, FOR SOLUTION INTRAVENOUS at 11:47

## 2022-11-25 RX ADMIN — MEROPENEM SCH MLS/HR: 1 INJECTION INTRAVENOUS at 13:52

## 2022-11-25 RX ADMIN — Medication SCH MG: at 11:30

## 2022-11-25 RX ADMIN — APIXABAN SCH MG: 2.5 TABLET, FILM COATED ORAL at 08:18

## 2022-11-25 RX ADMIN — PIPERACILLIN SODIUM AND TAZOBACTAM SODIUM SCH MLS/HR: .375; 3 INJECTION, POWDER, LYOPHILIZED, FOR SOLUTION INTRAVENOUS at 03:38

## 2022-11-25 RX ADMIN — LINEZOLID SCH MG: 600 TABLET, FILM COATED ORAL at 20:52

## 2022-11-25 RX ADMIN — Medication SCH MG: at 03:25

## 2022-11-25 RX ADMIN — ASPIRIN 81 MG SCH MG: 81 TABLET ORAL at 08:16

## 2022-11-25 RX ADMIN — METOPROLOL TARTRATE SCH MG: 25 TABLET, FILM COATED ORAL at 08:17

## 2022-11-25 RX ADMIN — Medication SCH MG: at 23:28

## 2022-11-25 RX ADMIN — Medication PRN ML: at 12:48

## 2022-11-25 RX ADMIN — METOPROLOL TARTRATE SCH MG: 25 TABLET, FILM COATED ORAL at 12:12

## 2022-11-25 NOTE — NUR
RN NOTES

RECEIVED PT ON BED, EYES OPEN, RESPONDS TO PAINFUL STIMULI, NONVERBAL,  ON TELE SR ,  NO 
S/SX OF ACUTE DISTRESS AT THIS TIME; CURRENTLY ON 2L OF 02 VIA NC; WITH 02 SAT WNL , IV 
ACCESS ON L HAND #20 PATENT, INTACT AND FLUSHING WELL. WITH G TUBE FEEDING RUNNING AS 
ORDERED. WILL ENSURE SAFETY MEASURES WITHIN THE SHIFT. PATIENT BED ALARM IS ON. HEAD OF BED 
ELEVATED. BED IS LOCKED,  IN LOWEST POSITION AND SIDE RAILS UP. CALL LIGHT WITHIN REACH OF 
THE PATIENT. WILL CONTINUE TO MONITOR

## 2022-11-25 NOTE — NUR
RN CLOSING NOTE: 



PATIENT REMAINS IN ROOM IN NO SIGNS OF RESPIRATORY DISTRESS, PATIENT STILL ON 2L OF O2 VIA 
NC; TOLERATING WELL SATURATING @ >95% SP02. SAFETY MEASURES IMPLEMENTED, BED IN LOWEST 
POSITION, LOCKED, SIDE RAILS UP, CALL LIGHT WITHIN REACH. ALL NEEDS AND ORDERS ADDRESSED 
DURING THE SHIFT. IV ACCESS MAINTAINED INTACT, SECURED AND FLUSHING WELL.TUBE FEEDING 
RUNNING PER ORDER; OFF BY 12NOON AND ON BY 0400PM. ALL DUE MEDS GIVEN AS ORDERED & SCHEDULED 
; PATIENT TOLERATED WELL. PATIENT KEPT CLEAN AND COMFORTABLE WITHIN THE SHIFT. PATIENT 
ENDORSED TO INCOMING SHIFT RN WITH STABLE VITAL SIGN AND FOR CONTINUITY OF CARE.

## 2022-11-25 NOTE — NUR
RN NOTES 

NO SIGNIFCANT CHANGES NOTED ON THIS SHIFT, WILL ENDORSE TO NIGHT SHIFT NURSE FOR CONTINUITY 
OF CARE .

## 2022-11-25 NOTE — NUR
RN NOTES



RECEIVED PT FOR CONTINUITY OF CARE. PATIENT A/OX1 IN NO S/SX OF ACUTE DISTRESS AT THIS TIME; 
CURRENTLY ON 2L OF 02 VIA NC; WITH 02 SAT >95% AT THIS TIME.WITH IV ACCESS ON L FA#22 
PATENT, INTACT AND FLUSHING WELL. WITH GTUBE FEEDING RUNNING AS ORDERED. WILL ENSURE SAFETY 
MEASURES WITHIN THE SHIFT. PATIENT BED ALARM IS ON. HEAD OF BED ELEVATED. BED IS LOCKED,  IN 
LOWEST POSITION AND SIDE RAILS UP. CALL LIGHT WITHIN REACH OF THE PATIENT. WILL CONTINUE TO 
MONITOR AND REASSESS FOR ANY CHANGES AND WILL CARRY OUT ANY ONGOING AND ACTIVE MD ORDER.

## 2022-11-26 VITALS — DIASTOLIC BLOOD PRESSURE: 62 MMHG | SYSTOLIC BLOOD PRESSURE: 127 MMHG

## 2022-11-26 VITALS — DIASTOLIC BLOOD PRESSURE: 65 MMHG | SYSTOLIC BLOOD PRESSURE: 121 MMHG

## 2022-11-26 VITALS — DIASTOLIC BLOOD PRESSURE: 74 MMHG | SYSTOLIC BLOOD PRESSURE: 144 MMHG

## 2022-11-26 LAB
BASOPHILS # BLD AUTO: 0 K/UL (ref 0–0.2)
BASOPHILS NFR BLD AUTO: 0.4 % (ref 0–2)
BUN SERPL-MCNC: 18 MG/DL (ref 7–18)
CALCIUM SERPL-MCNC: 8.4 MG/DL (ref 8.5–10.1)
CHLORIDE SERPL-SCNC: 104 MMOL/L (ref 98–107)
CO2 SERPL-SCNC: 31 MMOL/L (ref 21–32)
CREAT SERPL-MCNC: 0.5 MG/DL (ref 0.6–1.3)
EOSINOPHIL NFR BLD AUTO: 1.6 % (ref 0–6)
GLUCOSE SERPL-MCNC: 87 MG/DL (ref 74–106)
HCT VFR BLD AUTO: 31 % (ref 33–45)
HGB BLD-MCNC: 9.9 G/DL (ref 11.5–14.8)
LYMPHOCYTES NFR BLD AUTO: 0.7 K/UL (ref 0.8–4.8)
LYMPHOCYTES NFR BLD AUTO: 9.4 % (ref 20–44)
MCHC RBC AUTO-ENTMCNC: 31 G/DL (ref 31–36)
MCV RBC AUTO: 81 FL (ref 82–100)
MONOCYTES NFR BLD AUTO: 0.3 K/UL (ref 0.1–1.3)
MONOCYTES NFR BLD AUTO: 4.6 % (ref 2–12)
NEUTROPHILS # BLD AUTO: 5.8 K/UL (ref 1.8–8.9)
NEUTROPHILS NFR BLD AUTO: 84 % (ref 43–81)
PLATELET # BLD AUTO: 164 K/UL (ref 150–450)
POTASSIUM SERPL-SCNC: 3.7 MMOL/L (ref 3.5–5.1)
RBC # BLD AUTO: 3.88 MIL/UL (ref 4–5.2)
SODIUM SERPL-SCNC: 140 MMOL/L (ref 136–145)
WBC NRBC COR # BLD AUTO: 6.9 K/UL (ref 4.3–11)

## 2022-11-26 RX ADMIN — METOPROLOL TARTRATE SCH MG: 25 TABLET, FILM COATED ORAL at 16:38

## 2022-11-26 RX ADMIN — METOPROLOL TARTRATE SCH MG: 25 TABLET, FILM COATED ORAL at 08:24

## 2022-11-26 RX ADMIN — MEROPENEM SCH MLS/HR: 1 INJECTION INTRAVENOUS at 02:03

## 2022-11-26 RX ADMIN — LINEZOLID SCH MG: 600 TABLET, FILM COATED ORAL at 08:20

## 2022-11-26 RX ADMIN — PANTOPRAZOLE SODIUM SCH MG: 40 GRANULE, DELAYED RELEASE ORAL at 08:20

## 2022-11-26 RX ADMIN — MEROPENEM SCH MLS/HR: 1 INJECTION INTRAVENOUS at 14:40

## 2022-11-26 RX ADMIN — Medication SCH MG: at 20:04

## 2022-11-26 RX ADMIN — ASPIRIN 81 MG SCH MG: 81 TABLET ORAL at 08:20

## 2022-11-26 RX ADMIN — AMIODARONE HYDROCHLORIDE SCH MG: 200 TABLET ORAL at 08:24

## 2022-11-26 RX ADMIN — Medication PRN ML: at 15:24

## 2022-11-26 RX ADMIN — Medication SCH MG: at 03:17

## 2022-11-26 RX ADMIN — METOPROLOL TARTRATE SCH MG: 25 TABLET, FILM COATED ORAL at 12:10

## 2022-11-26 RX ADMIN — Medication SCH MG: at 23:04

## 2022-11-26 RX ADMIN — DONEPEZIL HYDROCHLORIDE SCH MG: 5 TABLET ORAL at 08:20

## 2022-11-26 RX ADMIN — APIXABAN SCH MG: 2.5 TABLET, FILM COATED ORAL at 16:39

## 2022-11-26 RX ADMIN — APIXABAN SCH MG: 2.5 TABLET, FILM COATED ORAL at 08:21

## 2022-11-26 RX ADMIN — Medication SCH MG: at 07:30

## 2022-11-26 RX ADMIN — Medication SCH MG: at 15:20

## 2022-11-26 RX ADMIN — LINEZOLID SCH MG: 600 TABLET, FILM COATED ORAL at 20:27

## 2022-11-26 RX ADMIN — Medication SCH MG: at 10:47

## 2022-11-26 NOTE — NUR
RN CLOSING NOTE: 



PATIENT REMAINS IN ROOM IN NO SIGNS OF RESPIRATORY DISTRESS, PATIENT NOW ON 1L OF O2 VIA NC; 
TOLERATING WELL SATURATING @ >95% SP02. SAFETY MEASURES IMPLEMENTED, BED IN LOWEST POSITION, 
LOCKED, SIDE RAILS UP, CALL LIGHT WITHIN REACH. ALL NEEDS AND ORDERS ADDRESSED DURING THE 
SHIFT. IV ACCESS MAINTAINED INTACT, SECURED AND FLUSHING WELL. TUBE FEEDING RUNNING PER 
ORDER; OFF BY 12NOON AND ON BY 0400PM. ALL DUE MEDS GIVEN AS ORDERED & SCHEDULED ; PATIENT 
TOLERATED WELL. PATIENT KEPT CLEAN AND COMFORTABLE WITHIN THE SHIFT. PATIENT ENDORSED TO 
INCOMING SHIFT RN WITH STABLE VITAL SIGN AND FOR CONTINUITY OF CARE.

## 2022-11-26 NOTE — NUR
RN OPENING NOTE



RECEIVED PATIENT IN BED, ASLEEP, EASILY AROUSED. NO SIGNS OF ACUTE DISTRESS NOTED. ON O2 @ 2 
LPM VIA N/C, NO SOB NOTED, BREATHING EVEN AND UNLABORED. WITH IV ACCESS ON LEFT FOREARM 
#22G, INTACT AND PATENT, ON TKO. WITH G-TUBE INTACT, FEEDING OF DIABETISOURCE @60ML/HR 
RUNNING. HOB ELEVATED. F/C INTACT DRAINING CLEAR YELLOW URINE OUTPUT. SAFETY MEASURE IN 
PLACE. BED IN LOWEST AND LOCKED POSITION, SIDE RAILS UP, CALL LIGHT PLACED WITHIN EASY 
REACH. WILL CONTINUE TO MONITOR PATIENT.

## 2022-11-26 NOTE — NUR
RN CLOSING NOTE



PATIENT IN BED, AWAKE. NO SIGNS OF ACUTE DISTRESS NOTED. REMAINS ON O2 @ 2 LPM VIA N/C, NO 
SOB NOTED, BREATHING EVEN AND UNLABORED. IV ACCESS ON LEFT FOREARM #22G, INTACT AND PATENT 
WITH NS RUNNING TKO. G-TUBE INTACT AND PATENT, FEEDING OF DIABETISOURCE @60ML/HR RUNNING, 
TOLERATED WELL. ALL DUE MEDS GIVEN. F/C INTACT DRAINING CLEAR YELLOW URINE OUTPUT. 
ASPIRATIONS AND SAFETY PRECAUTIONS MAINTAINED. BED IN LOWEST AND LOCKED POSITION, SIDE RAILS 
UP X2, CALL LIGHT PLACED WITHIN EASY REACH. WILL ENDORSE TO NEXT SHIFT FOR MAYE.

## 2022-11-27 VITALS — DIASTOLIC BLOOD PRESSURE: 67 MMHG | SYSTOLIC BLOOD PRESSURE: 146 MMHG

## 2022-11-27 VITALS — DIASTOLIC BLOOD PRESSURE: 72 MMHG | SYSTOLIC BLOOD PRESSURE: 139 MMHG

## 2022-11-27 VITALS — DIASTOLIC BLOOD PRESSURE: 54 MMHG | SYSTOLIC BLOOD PRESSURE: 111 MMHG

## 2022-11-27 LAB
BUN SERPL-MCNC: 18 MG/DL (ref 7–18)
CALCIUM SERPL-MCNC: 8.4 MG/DL (ref 8.5–10.1)
CHLORIDE SERPL-SCNC: 102 MMOL/L (ref 98–107)
CO2 SERPL-SCNC: 25 MMOL/L (ref 21–32)
CREAT SERPL-MCNC: 0.3 MG/DL (ref 0.6–1.3)
GLUCOSE SERPL-MCNC: 106 MG/DL (ref 74–106)
POTASSIUM SERPL-SCNC: 4.6 MMOL/L (ref 3.5–5.1)
SODIUM SERPL-SCNC: 133 MMOL/L (ref 136–145)

## 2022-11-27 RX ADMIN — Medication SCH MG: at 23:24

## 2022-11-27 RX ADMIN — APIXABAN SCH MG: 2.5 TABLET, FILM COATED ORAL at 17:41

## 2022-11-27 RX ADMIN — Medication SCH MG: at 07:43

## 2022-11-27 RX ADMIN — ASPIRIN 81 MG SCH MG: 81 TABLET ORAL at 08:26

## 2022-11-27 RX ADMIN — Medication SCH MG: at 03:56

## 2022-11-27 RX ADMIN — Medication SCH MG: at 11:06

## 2022-11-27 RX ADMIN — METOPROLOL TARTRATE SCH MG: 25 TABLET, FILM COATED ORAL at 17:40

## 2022-11-27 RX ADMIN — APIXABAN SCH MG: 2.5 TABLET, FILM COATED ORAL at 08:37

## 2022-11-27 RX ADMIN — Medication SCH MG: at 15:30

## 2022-11-27 RX ADMIN — AMIODARONE HYDROCHLORIDE SCH MG: 200 TABLET ORAL at 08:35

## 2022-11-27 RX ADMIN — LINEZOLID SCH MG: 600 TABLET, FILM COATED ORAL at 21:12

## 2022-11-27 RX ADMIN — Medication PRN ML: at 18:35

## 2022-11-27 RX ADMIN — Medication SCH MG: at 20:20

## 2022-11-27 RX ADMIN — DONEPEZIL HYDROCHLORIDE SCH MG: 5 TABLET ORAL at 08:26

## 2022-11-27 RX ADMIN — LINEZOLID SCH MG: 600 TABLET, FILM COATED ORAL at 08:26

## 2022-11-27 RX ADMIN — MEROPENEM SCH MLS/HR: 1 INJECTION INTRAVENOUS at 14:05

## 2022-11-27 RX ADMIN — PANTOPRAZOLE SODIUM SCH MG: 40 GRANULE, DELAYED RELEASE ORAL at 08:36

## 2022-11-27 RX ADMIN — METOPROLOL TARTRATE SCH MG: 25 TABLET, FILM COATED ORAL at 08:28

## 2022-11-27 RX ADMIN — MEROPENEM SCH MLS/HR: 1 INJECTION INTRAVENOUS at 01:08

## 2022-11-27 RX ADMIN — METOPROLOL TARTRATE SCH MG: 25 TABLET, FILM COATED ORAL at 12:20

## 2022-11-27 NOTE — NUR
MED-SURGE OPEN NOTE: EYES OPEN. ALERT TO NAME. REORIENTED AS NEEDED TO TIME AND PLACE. RA 
SATING 94 %. UNLABORED BREATHING. HOB ELEVATED SEMI-FOWLERS POSITION. GT IN PLACE PATENT 
WITH GTF FORMULA RUNNING AT 60 ML/HR. ASPIRATION PRECAUTIONS MAINTAINED. IV ON LEFT FOREARM 
G22 TKO. NO S/S OF COMPLICATIONS. SMITH IN PLACE DRAINING YELLOW URINE. BED IS LOCKED IN LOW 
POSITION, EXIT ALARM ON. CALL LIGHT IN REACH. NO S/S OF PAIN OR DISCOMFORT.

## 2022-11-27 NOTE — NUR
RN CLOSING   NOTES



 PATIENT IN BED,  AWAKE  .  A/O X  1  ,   NO N  VERBAL  , NO SIGNS OF ACUTE DISTRESS NOTED. 
ON O2 @ 1 LPM VIA N/C, NO SOB   OR  DISTRESS   NOTED, BREATHING EVEN AND UNLABORED. WITH IV 
ACCESS ON LEFT FOREARM #22G, INTACT AND PATENT, ON TKO. WITH G-TUBE INTACT, FEEDING OF 
DIABETISOURCE @60ML/HR RUNNING. HOB ELEVATED. F/C INTACT DRAINING CLEAR YELLOW URINE OUTPUT. 
ALL  DUE MEDS  GIVEN  AS  ORDERED  ,    NO  C/O   OF PAIN AND  DISCOMFORT  , SEEN  BY  DR BAIRD  AND  PATIENT  WAS PUT ON  ROOM AIR  TRIAL  AND  O2  SATURATION   OF  95%.

SAFETY MEASURES  IN PLACE. BED IN LOWEST AND LOCKED POSITION, SIDE RAILS UP, CALL LIGHT 
PLACED WITHIN EASY REACH.  ENDORSED  TO  NEXT  SHIFT  .

## 2022-11-27 NOTE — NUR
RN OPENING NOTE



RECEIVED PATIENT IN BED,  AWAKE  . NO SIGNS OF ACUTE DISTRESS NOTED. ON O2 @ 1 LPM VIA N/C, 
NO SOB   OR  DISTRESS   NOTED, BREATHING EVEN AND UNLABORED. WITH IV ACCESS ON LEFT FOREARM 
#22G, INTACT AND PATENT, ON TKO. WITH G-TUBE INTACT, FEEDING OF DIABETISOURCE @60ML/HR 
RUNNING. HOB ELEVATED. F/C INTACT DRAINING CLEAR YELLOW URINE OUTPUT. SAFETY MEASURES  IN 
PLACE. BED IN LOWEST AND LOCKED POSITION, SIDE RAILS UP, CALL LIGHT PLACED WITHIN EASY 
REACH. WILL CONTINUE TO MONITOR PATIENT.

## 2022-11-27 NOTE — NUR
MS ADMITTING NOTE

RECEIVED REPORT FROM TATE NURSE, MARIVEL HUTCHINSON. PATIENT IS [facility] RESIDENT. PATIENT WAS IN 
TATE AND IS TRANSFERRED TO MED-SURG. 

PATIENT ARRIVED IN BED. AO X 1. APHASIA. SHE IS ON RA, NO S/S OF DISTRESS OR SOB. PATIENT 
HAS IV ACCESS AT HER LEFT FA, #22 G. SL. FLUSHED WITH 10 CC NS, PATENT AND INTACT. PATIENT 
HAS G-TUBE, PLACEMENT IS CHECKED. RESIDUAL: ZERO. ELEVATED THE HOB AND RESUMED THE FEEDING. 
FEEDING ORDER IS FOR GLUCERNA 1.2 RUNNING AT 60 ML/ HR FOR 20 HOURS. THERE IS A SPECIAL 
INSTRUCTION FOR THE FEEDING PER MD ORDER: HOLD FEEDING 1 HOUR BEFORE AND AFTER LEVAQUIN, 
AVELOX, CIPRO, AND COUMADIN WHEN GIVEN PO OR G-TUBE; HOLD 2 OURS BEFORE AND AFTER DILANTIN 
WHEN GIVEN PO / G-TUBE. ORIENTED THE PATIENT WITH SURROUNDINGS, SET UP THE FEEDING PUMP, IV 
PUMP, AND SUCTION EQUIPMENT. SAFETY MEASURES IN PLACE: BED IS LOCKED AND IN LOWEST POSITION; 
SIDE RAILS UP X 3; CALL LIGHT IN REACH; HOURLY ROUNDING. PATIENT'S WOUND ARE ASSESSED, 
DOCUMENTED AND THE PICTURES. THE PICTURES OF THE WOUND WERE TAKEN AND BEING PUT IN THE 
CHART. WILL CONTINUE MONITOR THE PATIENT AND PROVIDE THE CARE SHE NEEDS.

## 2022-11-28 VITALS — SYSTOLIC BLOOD PRESSURE: 141 MMHG | DIASTOLIC BLOOD PRESSURE: 66 MMHG

## 2022-11-28 VITALS — DIASTOLIC BLOOD PRESSURE: 68 MMHG | SYSTOLIC BLOOD PRESSURE: 142 MMHG

## 2022-11-28 LAB
BUN SERPL-MCNC: 19 MG/DL (ref 7–18)
CALCIUM SERPL-MCNC: 8.1 MG/DL (ref 8.5–10.1)
CHLORIDE SERPL-SCNC: 105 MMOL/L (ref 98–107)
CO2 SERPL-SCNC: 34 MMOL/L (ref 21–32)
CREAT SERPL-MCNC: 0.5 MG/DL (ref 0.6–1.3)
GLUCOSE SERPL-MCNC: 116 MG/DL (ref 74–106)
POTASSIUM SERPL-SCNC: 3.9 MMOL/L (ref 3.5–5.1)
SODIUM SERPL-SCNC: 142 MMOL/L (ref 136–145)

## 2022-11-28 RX ADMIN — MEROPENEM SCH MLS/HR: 1 INJECTION INTRAVENOUS at 01:13

## 2022-11-28 RX ADMIN — APIXABAN SCH MG: 2.5 TABLET, FILM COATED ORAL at 08:33

## 2022-11-28 RX ADMIN — LINEZOLID SCH MG: 600 TABLET, FILM COATED ORAL at 08:31

## 2022-11-28 RX ADMIN — Medication SCH MG: at 08:15

## 2022-11-28 RX ADMIN — AMIODARONE HYDROCHLORIDE SCH MG: 200 TABLET ORAL at 08:31

## 2022-11-28 RX ADMIN — Medication SCH MG: at 11:02

## 2022-11-28 RX ADMIN — Medication SCH MG: at 03:30

## 2022-11-28 RX ADMIN — METOPROLOL TARTRATE SCH MG: 25 TABLET, FILM COATED ORAL at 12:25

## 2022-11-28 RX ADMIN — ASPIRIN 81 MG SCH MG: 81 TABLET ORAL at 08:32

## 2022-11-28 RX ADMIN — PANTOPRAZOLE SODIUM SCH MG: 40 GRANULE, DELAYED RELEASE ORAL at 08:32

## 2022-11-28 RX ADMIN — METOPROLOL TARTRATE SCH MG: 25 TABLET, FILM COATED ORAL at 08:32

## 2022-11-28 RX ADMIN — DONEPEZIL HYDROCHLORIDE SCH MG: 5 TABLET ORAL at 08:32

## 2022-11-28 NOTE — NUR
MS RN OPENING NOTES:



RECEIVED PATIENT IN BED ASLEEP EASILY AROUSED WITH VERBAL STIMULI, ALERT AND ORIENTED X 1. 
NO SOB OR CARDIAC DISTRESS NOTED. DENIES ANY PAIN AT THIS TIME. ON ROOM AIR AND TOLERATING 
WELL. IV ACCESS ON LEFT FOREARM GAUGE 22 PATENT AND INTACT AND SALINE LOCKED G TUBE PATENT 
AND INTACT. SAFETY MEASURES MAINTAINED: BED LOCKED AND IN LOWEST POSITION, SIDE RAILS UP X2, 
CALL LIGHT AND BED SIDE TABLE IN EASY REACH. WILL MONITOR ACCORDINGLY. KEPT RESTED AND 
COMFORTABLE.

## 2022-11-28 NOTE — NUR
RN DISCHARGE NOTES:



PATIENT DC TO [facility] ([place]) REPORT GIVEN TO MARIVEL OSULLIVAN. PT NON VERBAL AWAKE A/O X 1. NO 
SOB OR CARDIAC DISTRESS NOTED. IV ACCESS REMOVED, IDENTIFICATION BAND IN PLACE. DISCHARGE 
PACKET GIVEN TO EMT. VS WNL. NO BELONGINGS CARRIED WITH THE PT. PT HAS G-TUBE PATENT AND 
INTACT, NO RESIDUAL NOTED. ABDOMEN SLIGHTLY DISTENDED. PHOTOS TAKEN AND FILED TO PT'S CHART. 
PT ACCOMPANIED BY EMT VIA RADHA, PT LEFT THE UNIT STABLE.

## 2022-11-28 NOTE — NUR
MS CLOSING NOTE

PATIENT IS SLEEPING IN BED.  IV ACCESS IS AT HER LEFT FA, #22 G. SL. FLUSHED WITH 10 CC NS, 
PATENT AND INTACT.  G-TUBE RUNNING THE FEEDING  IS RUNNING AT 60 ML/ HOUR; TOLERATED WELL. 
HOB REMAINS ELEVATED ABOVE 45 DEGREE DURING THE FEEDING.  SAFETY MEASURES IN PLACE: BED IS 
LOCKED AND IN LOWEST POSITION; SIDE RAILS UP X 3; CALL LIGHT IN REACH; HOURLY ROUNDING.  
WILL ENDORSE NEXT SHIFT NURSE FOR CONTINUE PATIENT CARE.

## 2024-05-09 NOTE — NUR
Physical Therapy POC                 Therapy Communication Note    Patient Name: Qi Davila  MRN: [id number]  Today's Date: 5/9/2024     Discipline: Physical Therapy    Comment: PT GOALS UPDATED to reflect extended LOS   RN OPENING NOTES:



PATIENT IN BED AWAKE, NON VERBAL. RESPONSIVE TO TOUCH AND VERBAL STIMULI , OPENS EYES. PT ON 
O2 NC 3LPM TOLERATING WELL WITH SATURATION OF 94%. BREATHING EVEN AND UNLABORED.ON 
TELEMONITORING CURRENTLY READING SR AT 90'S, IV ACCESS AT EVONNE MIDLINE, LEFT AC 18 GAUGE, NO 
SIGNS OF INFILTRATION. ON G TUBE FEEDING, GLUCERNA AT 40ML/HR AND TOLERATING WELL, NO 
RESIDUAL NOTED AND POSITIVE PLACEMENT NOTED. SMITH CATHETER PATENT DRAINING YELLOWISH URINE 
OUTPUT, KEPT DRY AND CLEAN, ALL SAFETY MEASURES IN PLACE, CALL LIGHT WITHIN REACH. BED 
LOCKED IN LOWEST POSITION, ALARM ON.WILL MONITOR